# Patient Record
Sex: MALE | Race: BLACK OR AFRICAN AMERICAN | Employment: FULL TIME | URBAN - METROPOLITAN AREA
[De-identification: names, ages, dates, MRNs, and addresses within clinical notes are randomized per-mention and may not be internally consistent; named-entity substitution may affect disease eponyms.]

---
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
## 2020-05-11 ENCOUNTER — APPOINTMENT (OUTPATIENT)
Dept: CT IMAGING | Age: 20
End: 2020-05-11
Attending: EMERGENCY MEDICINE
Payer: OTHER GOVERNMENT

## 2020-05-11 ENCOUNTER — APPOINTMENT (OUTPATIENT)
Dept: GENERAL RADIOLOGY | Age: 20
End: 2020-05-11
Attending: EMERGENCY MEDICINE
Payer: OTHER GOVERNMENT

## 2020-05-11 ENCOUNTER — HOSPITAL ENCOUNTER (INPATIENT)
Age: 20
LOS: 3 days | Discharge: HOME | DRG: 076 | End: 2020-05-14
Attending: INTERNAL MEDICINE | Admitting: FAMILY MEDICINE
Payer: SELF-PAY

## 2020-05-11 ENCOUNTER — HOSPITAL ENCOUNTER (EMERGENCY)
Age: 20
Discharge: ANOTHER HOSPITAL | End: 2020-05-11
Attending: EMERGENCY MEDICINE
Payer: OTHER GOVERNMENT

## 2020-05-11 VITALS
DIASTOLIC BLOOD PRESSURE: 79 MMHG | TEMPERATURE: 101.7 F | OXYGEN SATURATION: 99 % | BODY MASS INDEX: 19.32 KG/M2 | SYSTOLIC BLOOD PRESSURE: 144 MMHG | HEART RATE: 81 BPM | WEIGHT: 138 LBS | RESPIRATION RATE: 15 BRPM | HEIGHT: 71 IN

## 2020-05-11 DIAGNOSIS — R51.9 NONINTRACTABLE HEADACHE, UNSPECIFIED CHRONICITY PATTERN, UNSPECIFIED HEADACHE TYPE: Primary | ICD-10-CM

## 2020-05-11 DIAGNOSIS — G03.9 MENINGITIS: ICD-10-CM

## 2020-05-11 DIAGNOSIS — R11.10 VOMITING, INTRACTABILITY OF VOMITING NOT SPECIFIED, PRESENCE OF NAUSEA NOT SPECIFIED, UNSPECIFIED VOMITING TYPE: ICD-10-CM

## 2020-05-11 LAB
ALBUMIN SERPL-MCNC: 4.3 G/DL (ref 3.5–5)
ALBUMIN/GLOB SERPL: 1.1 {RATIO} (ref 1.2–3.5)
ALP SERPL-CCNC: 74 U/L (ref 50–136)
ALT SERPL-CCNC: 19 U/L (ref 12–65)
ANION GAP SERPL CALC-SCNC: 8 MMOL/L (ref 7–16)
APPEARANCE FLD: COLORLESS
APPEARANCE FLD: COLORLESS
AST SERPL-CCNC: 19 U/L (ref 15–37)
BASOPHILS # BLD: 0.1 K/UL (ref 0–0.2)
BASOPHILS NFR BLD: 0 % (ref 0–2)
BILIRUB SERPL-MCNC: 1 MG/DL (ref 0.2–1.1)
BUN SERPL-MCNC: 5 MG/DL (ref 6–23)
CALCIUM SERPL-MCNC: 9.3 MG/DL (ref 8.3–10.4)
CHLORIDE SERPL-SCNC: 101 MMOL/L (ref 98–107)
CO2 SERPL-SCNC: 25 MMOL/L (ref 21–32)
COLOR FLD: CLEAR
COLOR FLD: CLEAR
CREAT SERPL-MCNC: 1.07 MG/DL (ref 0.8–1.5)
DIFFERENTIAL METHOD BLD: ABNORMAL
EOSINOPHIL # BLD: 0 K/UL (ref 0–0.8)
EOSINOPHIL NFR BLD: 0 % (ref 0.5–7.8)
ERYTHROCYTE [DISTWIDTH] IN BLOOD BY AUTOMATED COUNT: 12.4 % (ref 11.9–14.6)
GLOBULIN SER CALC-MCNC: 3.8 G/DL (ref 2.3–3.5)
GLUCOSE CSF-MCNC: 57 MG/DL (ref 40–70)
GLUCOSE SERPL-MCNC: 111 MG/DL (ref 65–100)
HCT VFR BLD AUTO: 49.7 % (ref 41.1–50.3)
HGB BLD-MCNC: 16.6 G/DL (ref 13.6–17.2)
IMM GRANULOCYTES # BLD AUTO: 0 K/UL (ref 0–0.5)
IMM GRANULOCYTES NFR BLD AUTO: 0 % (ref 0–5)
LYMPHOCYTES # BLD: 1.1 K/UL (ref 0.5–4.6)
LYMPHOCYTES NFR BLD: 9 % (ref 13–44)
LYMPHOCYTES NFR FLD: 89 %
LYMPHOCYTES NFR FLD: 92 %
MCH RBC QN AUTO: 29.1 PG (ref 26.1–32.9)
MCHC RBC AUTO-ENTMCNC: 33.4 G/DL (ref 31.4–35)
MCV RBC AUTO: 87 FL (ref 79.6–97.8)
MENINGITIS PANEL, XMEPT: NORMAL
MONOCYTES # BLD: 0.9 K/UL (ref 0.1–1.3)
MONOCYTES NFR BLD: 7 % (ref 4–12)
MONOS+MACROS NFR FLD: 10 %
MONOS+MACROS NFR FLD: 7 %
NEUTROPHILS NFR FLD: 1 %
NEUTROPHILS NFR FLD: 1 %
NEUTS SEG # BLD: 10.4 K/UL (ref 1.7–8.2)
NEUTS SEG NFR BLD: 83 % (ref 43–78)
NRBC # BLD: 0 K/UL (ref 0–0.2)
NUC CELL # FLD: 74 /CU MM
NUC CELL # FLD: 92 /CU MM
PLATELET # BLD AUTO: 231 K/UL (ref 150–450)
PMV BLD AUTO: 10.5 FL (ref 9.4–12.3)
POTASSIUM SERPL-SCNC: 3.9 MMOL/L (ref 3.5–5.1)
PROT CSF-MCNC: 89 MG/DL (ref 15–45)
PROT SERPL-MCNC: 8.1 G/DL (ref 6.3–8.2)
RBC # BLD AUTO: 5.71 M/UL (ref 4.23–5.6)
RBC # FLD: 11 /CU MM
RBC # FLD: 7 /CU MM
SODIUM SERPL-SCNC: 134 MMOL/L (ref 136–145)
SPECIMEN SOURCE FLD: NORMAL
SPECIMEN SOURCE FLD: NORMAL
TROPONIN I SERPL-MCNC: 0.02 NG/ML (ref 0.02–0.05)
TUBE # CSF: 2
TUBE # CSF: 2
WBC # BLD AUTO: 12.5 K/UL (ref 4.3–11.1)

## 2020-05-11 PROCEDURE — 74011000258 HC RX REV CODE- 258: Performed by: EMERGENCY MEDICINE

## 2020-05-11 PROCEDURE — 81003 URINALYSIS AUTO W/O SCOPE: CPT

## 2020-05-11 PROCEDURE — 74011250636 HC RX REV CODE- 250/636: Performed by: EMERGENCY MEDICINE

## 2020-05-11 PROCEDURE — 87483 CNS DNA AMP PROBE TYPE 12-25: CPT

## 2020-05-11 PROCEDURE — 85025 COMPLETE CBC W/AUTO DIFF WBC: CPT

## 2020-05-11 PROCEDURE — 93005 ELECTROCARDIOGRAM TRACING: CPT | Performed by: EMERGENCY MEDICINE

## 2020-05-11 PROCEDURE — 74011250637 HC RX REV CODE- 250/637: Performed by: FAMILY MEDICINE

## 2020-05-11 PROCEDURE — 96365 THER/PROPH/DIAG IV INF INIT: CPT

## 2020-05-11 PROCEDURE — 74011250636 HC RX REV CODE- 250/636: Performed by: FAMILY MEDICINE

## 2020-05-11 PROCEDURE — 87205 SMEAR GRAM STAIN: CPT

## 2020-05-11 PROCEDURE — 99222 1ST HOSP IP/OBS MODERATE 55: CPT | Performed by: PSYCHIATRY & NEUROLOGY

## 2020-05-11 PROCEDURE — 99285 EMERGENCY DEPT VISIT HI MDM: CPT

## 2020-05-11 PROCEDURE — 71046 X-RAY EXAM CHEST 2 VIEWS: CPT

## 2020-05-11 PROCEDURE — 84157 ASSAY OF PROTEIN OTHER: CPT

## 2020-05-11 PROCEDURE — 87635 SARS-COV-2 COVID-19 AMP PRB: CPT

## 2020-05-11 PROCEDURE — 74011250636 HC RX REV CODE- 250/636: Performed by: INTERNAL MEDICINE

## 2020-05-11 PROCEDURE — 86694 HERPES SIMPLEX NES ANTBDY: CPT

## 2020-05-11 PROCEDURE — 82945 GLUCOSE OTHER FLUID: CPT

## 2020-05-11 PROCEDURE — 74011000258 HC RX REV CODE- 258: Performed by: FAMILY MEDICINE

## 2020-05-11 PROCEDURE — 87040 BLOOD CULTURE FOR BACTERIA: CPT

## 2020-05-11 PROCEDURE — 75810000133 HC LUMBAR PUNCTURE

## 2020-05-11 PROCEDURE — 84484 ASSAY OF TROPONIN QUANT: CPT

## 2020-05-11 PROCEDURE — 89050 BODY FLUID CELL COUNT: CPT

## 2020-05-11 PROCEDURE — 96367 TX/PROPH/DG ADDL SEQ IV INF: CPT

## 2020-05-11 PROCEDURE — 70450 CT HEAD/BRAIN W/O DYE: CPT

## 2020-05-11 PROCEDURE — 96361 HYDRATE IV INFUSION ADD-ON: CPT

## 2020-05-11 PROCEDURE — 65270000029 HC RM PRIVATE

## 2020-05-11 PROCEDURE — 96375 TX/PRO/DX INJ NEW DRUG ADDON: CPT

## 2020-05-11 PROCEDURE — 80053 COMPREHEN METABOLIC PANEL: CPT

## 2020-05-11 RX ORDER — ONDANSETRON 2 MG/ML
4 INJECTION INTRAMUSCULAR; INTRAVENOUS
Status: COMPLETED | OUTPATIENT
Start: 2020-05-11 | End: 2020-05-11

## 2020-05-11 RX ORDER — ACETAMINOPHEN 325 MG/1
650 TABLET ORAL
Status: DISCONTINUED | OUTPATIENT
Start: 2020-05-11 | End: 2020-05-11 | Stop reason: HOSPADM

## 2020-05-11 RX ORDER — HYDROCODONE BITARTRATE AND ACETAMINOPHEN 5; 325 MG/1; MG/1
1 TABLET ORAL
Status: DISCONTINUED | OUTPATIENT
Start: 2020-05-11 | End: 2020-05-11 | Stop reason: HOSPADM

## 2020-05-11 RX ORDER — HYDROMORPHONE HYDROCHLORIDE 1 MG/ML
1 INJECTION, SOLUTION INTRAMUSCULAR; INTRAVENOUS; SUBCUTANEOUS
Status: DISCONTINUED | OUTPATIENT
Start: 2020-05-11 | End: 2020-05-14 | Stop reason: HOSPADM

## 2020-05-11 RX ORDER — DIPHENHYDRAMINE HCL 25 MG
25 CAPSULE ORAL
Status: DISCONTINUED | OUTPATIENT
Start: 2020-05-11 | End: 2020-05-14 | Stop reason: HOSPADM

## 2020-05-11 RX ORDER — ACETAMINOPHEN 325 MG/1
650 TABLET ORAL
Status: CANCELLED | OUTPATIENT
Start: 2020-05-11

## 2020-05-11 RX ORDER — HYDROMORPHONE HYDROCHLORIDE 1 MG/ML
0.5 INJECTION, SOLUTION INTRAMUSCULAR; INTRAVENOUS; SUBCUTANEOUS
Status: COMPLETED | OUTPATIENT
Start: 2020-05-11 | End: 2020-05-11

## 2020-05-11 RX ORDER — ACETAMINOPHEN 325 MG/1
650 TABLET ORAL
Status: DISCONTINUED | OUTPATIENT
Start: 2020-05-11 | End: 2020-05-14 | Stop reason: HOSPADM

## 2020-05-11 RX ORDER — NALOXONE HYDROCHLORIDE 0.4 MG/ML
0.4 INJECTION, SOLUTION INTRAMUSCULAR; INTRAVENOUS; SUBCUTANEOUS AS NEEDED
Status: DISCONTINUED | OUTPATIENT
Start: 2020-05-11 | End: 2020-05-14 | Stop reason: HOSPADM

## 2020-05-11 RX ORDER — HYDROCODONE BITARTRATE AND ACETAMINOPHEN 5; 325 MG/1; MG/1
1 TABLET ORAL
Status: DISCONTINUED | OUTPATIENT
Start: 2020-05-11 | End: 2020-05-14 | Stop reason: HOSPADM

## 2020-05-11 RX ORDER — HYDRALAZINE HYDROCHLORIDE 20 MG/ML
10 INJECTION INTRAMUSCULAR; INTRAVENOUS
Status: DISCONTINUED | OUTPATIENT
Start: 2020-05-11 | End: 2020-05-11

## 2020-05-11 RX ORDER — VANCOMYCIN HYDROCHLORIDE
1250 ONCE
Status: COMPLETED | OUTPATIENT
Start: 2020-05-11 | End: 2020-05-11

## 2020-05-11 RX ORDER — ONDANSETRON 2 MG/ML
4 INJECTION INTRAMUSCULAR; INTRAVENOUS
Status: DISCONTINUED | OUTPATIENT
Start: 2020-05-11 | End: 2020-05-14 | Stop reason: HOSPADM

## 2020-05-11 RX ORDER — HYDROCODONE BITARTRATE AND ACETAMINOPHEN 5; 325 MG/1; MG/1
1 TABLET ORAL
Status: CANCELLED | OUTPATIENT
Start: 2020-05-11

## 2020-05-11 RX ORDER — AMOXICILLIN 250 MG
2 CAPSULE ORAL
Status: DISCONTINUED | OUTPATIENT
Start: 2020-05-11 | End: 2020-05-14 | Stop reason: HOSPADM

## 2020-05-11 RX ADMIN — ONDANSETRON 4 MG: 2 INJECTION INTRAMUSCULAR; INTRAVENOUS at 07:50

## 2020-05-11 RX ADMIN — AMPICILLIN SODIUM 2 G: 2 INJECTION, POWDER, FOR SOLUTION INTRAMUSCULAR; INTRAVENOUS at 19:51

## 2020-05-11 RX ADMIN — HYDROMORPHONE HYDROCHLORIDE 0.5 MG: 1 INJECTION, SOLUTION INTRAMUSCULAR; INTRAVENOUS; SUBCUTANEOUS at 12:05

## 2020-05-11 RX ADMIN — AMPICILLIN SODIUM 2 G: 2 INJECTION, POWDER, FOR SOLUTION INTRAMUSCULAR; INTRAVENOUS at 16:40

## 2020-05-11 RX ADMIN — HYDROCODONE BITARTRATE AND ACETAMINOPHEN 1 TABLET: 5; 325 TABLET ORAL at 13:21

## 2020-05-11 RX ADMIN — SODIUM CHLORIDE 1000 ML: 900 INJECTION, SOLUTION INTRAVENOUS at 07:48

## 2020-05-11 RX ADMIN — HYDROMORPHONE HYDROCHLORIDE 0.5 MG: 1 INJECTION, SOLUTION INTRAMUSCULAR; INTRAVENOUS; SUBCUTANEOUS at 07:51

## 2020-05-11 RX ADMIN — ACYCLOVIR SODIUM 650 MG: 50 INJECTION, SOLUTION INTRAVENOUS at 22:29

## 2020-05-11 RX ADMIN — ACYCLOVIR SODIUM 650 MG: 50 INJECTION, SOLUTION INTRAVENOUS at 15:23

## 2020-05-11 RX ADMIN — CEFTRIAXONE SODIUM 2 G: 2 INJECTION, POWDER, FOR SOLUTION INTRAMUSCULAR; INTRAVENOUS at 13:22

## 2020-05-11 RX ADMIN — ACETAMINOPHEN 650 MG: 325 TABLET, FILM COATED ORAL at 19:51

## 2020-05-11 RX ADMIN — AMPICILLIN SODIUM 2 G: 2 INJECTION, POWDER, FOR SOLUTION INTRAMUSCULAR; INTRAVENOUS at 23:26

## 2020-05-11 RX ADMIN — VANCOMYCIN HYDROCHLORIDE 1000 MG: 1 INJECTION, POWDER, LYOPHILIZED, FOR SOLUTION INTRAVENOUS at 21:07

## 2020-05-11 RX ADMIN — VANCOMYCIN HYDROCHLORIDE 1250 MG: 10 INJECTION, POWDER, LYOPHILIZED, FOR SOLUTION INTRAVENOUS at 11:54

## 2020-05-11 RX ADMIN — ONDANSETRON 4 MG: 2 INJECTION INTRAMUSCULAR; INTRAVENOUS at 21:02

## 2020-05-11 NOTE — ED PROVIDER NOTES
Patient is a 23 yo male with headache, nausea and vomiting and chest pain. States symptoms since yesterday, states gradual onset of headache and persistent nausea and vomiting. No fevers however has had chills. States chest pain since he started vomiting and states \"I think its just from all the vomiting\". NO abdominal pain, no further complaints. Past Medical History:   Diagnosis Date    Other ill-defined conditions(629.89)     eczema       History reviewed. No pertinent surgical history.       Family History:   Problem Relation Age of Onset    No Known Problems Mother     No Known Problems Father        Social History     Socioeconomic History    Marital status: SINGLE     Spouse name: Not on file    Number of children: Not on file    Years of education: Not on file    Highest education level: Not on file   Occupational History    Not on file   Social Needs    Financial resource strain: Not on file    Food insecurity     Worry: Not on file     Inability: Not on file    Transportation needs     Medical: Not on file     Non-medical: Not on file   Tobacco Use    Smoking status: Never Smoker    Smokeless tobacco: Never Used   Substance and Sexual Activity    Alcohol use: Yes     Comment: OCC    Drug use: No     Comment: History of marijuana use    Sexual activity: Not Currently   Lifestyle    Physical activity     Days per week: Not on file     Minutes per session: Not on file    Stress: Not on file   Relationships    Social connections     Talks on phone: Not on file     Gets together: Not on file     Attends Rastafarian service: Not on file     Active member of club or organization: Not on file     Attends meetings of clubs or organizations: Not on file     Relationship status: Not on file    Intimate partner violence     Fear of current or ex partner: Not on file     Emotionally abused: Not on file     Physically abused: Not on file     Forced sexual activity: Not on file   Other Topics Concern    Not on file   Social History Narrative    Not on file         ALLERGIES: Patient has no known allergies. Review of Systems   Constitutional: Positive for chills and fatigue. Negative for fever. HENT: Negative for rhinorrhea and sore throat. Eyes: Negative for visual disturbance. Respiratory: Negative for cough and shortness of breath. Cardiovascular: Positive for chest pain. Negative for leg swelling. Gastrointestinal: Positive for nausea and vomiting. Negative for abdominal pain and diarrhea. Genitourinary: Negative for dysuria. Musculoskeletal: Negative for back pain and neck pain. Skin: Negative for rash. Neurological: Positive for headaches. Negative for weakness. Psychiatric/Behavioral: The patient is not nervous/anxious. Vitals:    05/11/20 0721   BP: 118/66   Pulse: 85   Resp: 20   Temp: 98.5 °F (36.9 °C)   SpO2: 98%   Weight: 62.6 kg (138 lb)   Height: 5' 11\" (1.803 m)            Physical Exam  Vitals signs and nursing note reviewed. Constitutional:       Appearance: He is well-developed. HENT:      Head: Normocephalic. Right Ear: External ear normal.      Left Ear: External ear normal.   Eyes:      Conjunctiva/sclera: Conjunctivae normal.      Pupils: Pupils are equal, round, and reactive to light. Neck:      Musculoskeletal: Normal range of motion and neck supple. Trachea: No tracheal deviation. Cardiovascular:      Rate and Rhythm: Normal rate and regular rhythm. Heart sounds: Normal heart sounds. No murmur. Pulmonary:      Effort: Pulmonary effort is normal. No respiratory distress. Breath sounds: Normal breath sounds. Abdominal:      Palpations: Abdomen is soft. Tenderness: There is no abdominal tenderness. Musculoskeletal: Normal range of motion. Skin:     Findings: No rash. Neurological:      Mental Status: He is alert and oriented to person, place, and time. Cranial Nerves: No cranial nerve deficit. Comments: Cn 2-12 fully intact, strength and sensation 5/5 in all extremities, negative pronator drift, ambulates without difficulty, no focal deficits appreciated. MDM  Number of Diagnoses or Management Options  Nonintractable headache, unspecified chronicity pattern, unspecified headache type: new and requires workup  Vomiting, intractability of vomiting not specified, presence of nausea not specified, unspecified vomiting type: new and requires workup     Amount and/or Complexity of Data Reviewed  Clinical lab tests: ordered and reviewed  Tests in the radiology section of CPT®: ordered and reviewed  Tests in the medicine section of CPT®: reviewed and ordered  Review and summarize past medical records: yes    Risk of Complications, Morbidity, and/or Mortality  Presenting problems: high  Diagnostic procedures: high  Management options: high    Patient Progress  Patient progress: stable         Lumbar Puncture  Date/Time: 5/11/2020 11:09 AM  Performed by: Christel Bautista MD  Authorized by: Christel Bautista MD     Consent:     Consent obtained:  Written    Consent given by:  Patient    Risks discussed:  Bleeding, pain, infection, headache, nerve damage and repeat procedure    Alternatives discussed:  No treatment, delayed treatment and alternative treatment  Procedure details:     Lumbar space:  L4-L5 interspace    Patient position:  Sitting    Needle gauge:  20    Needle type:  Spinal needle - Quincke tip    Needle length (in):  2.5    Ultrasound guidance: no      Number of attempts:  1    Fluid appearance:  Clear    Tubes of fluid:  4    Total volume (ml):  5  Post-procedure:     Puncture site:  Adhesive bandage applied and direct pressure applied    Patient tolerance of procedure:   Tolerated well, no immediate complications        Recent Results (from the past 12 hour(s))   CBC WITH AUTOMATED DIFF    Collection Time: 05/11/20  7:33 AM   Result Value Ref Range    WBC 12.5 (H) 4.3 - 11.1 K/uL RBC 5.71 (H) 4.23 - 5.6 M/uL    HGB 16.6 13.6 - 17.2 g/dL    HCT 49.7 41.1 - 50.3 %    MCV 87.0 79.6 - 97.8 FL    MCH 29.1 26.1 - 32.9 PG    MCHC 33.4 31.4 - 35.0 g/dL    RDW 12.4 11.9 - 14.6 %    PLATELET 508 693 - 158 K/uL    MPV 10.5 9.4 - 12.3 FL    ABSOLUTE NRBC 0.00 0.0 - 0.2 K/uL    DF AUTOMATED      NEUTROPHILS 83 (H) 43 - 78 %    LYMPHOCYTES 9 (L) 13 - 44 %    MONOCYTES 7 4.0 - 12.0 %    EOSINOPHILS 0 (L) 0.5 - 7.8 %    BASOPHILS 0 0.0 - 2.0 %    IMMATURE GRANULOCYTES 0 0.0 - 5.0 %    ABS. NEUTROPHILS 10.4 (H) 1.7 - 8.2 K/UL    ABS. LYMPHOCYTES 1.1 0.5 - 4.6 K/UL    ABS. MONOCYTES 0.9 0.1 - 1.3 K/UL    ABS. EOSINOPHILS 0.0 0.0 - 0.8 K/UL    ABS. BASOPHILS 0.1 0.0 - 0.2 K/UL    ABS. IMM. GRANS. 0.0 0.0 - 0.5 K/UL   METABOLIC PANEL, COMPREHENSIVE    Collection Time: 05/11/20  7:33 AM   Result Value Ref Range    Sodium 134 (L) 136 - 145 mmol/L    Potassium 3.9 3.5 - 5.1 mmol/L    Chloride 101 98 - 107 mmol/L    CO2 25 21 - 32 mmol/L    Anion gap 8 7 - 16 mmol/L    Glucose 111 (H) 65 - 100 mg/dL    BUN 5 (L) 6 - 23 MG/DL    Creatinine 1.07 0.8 - 1.5 MG/DL    GFR est AA >60 >60 ml/min/1.73m2    GFR est non-AA >60 >60 ml/min/1.73m2    Calcium 9.3 8.3 - 10.4 MG/DL    Bilirubin, total 1.0 0.2 - 1.1 MG/DL    ALT (SGPT) 19 12 - 65 U/L    AST (SGOT) 19 15 - 37 U/L    Alk.  phosphatase 74 50 - 136 U/L    Protein, total 8.1 6.3 - 8.2 g/dL    Albumin 4.3 3.5 - 5.0 g/dL    Globulin 3.8 (H) 2.3 - 3.5 g/dL    A-G Ratio 1.1 (L) 1.2 - 3.5     TROPONIN I    Collection Time: 05/11/20  7:33 AM   Result Value Ref Range    Troponin-I, Qt. 0.02 0.02 - 0.05 NG/ML   CELL COUNT, BODY FLUID    Collection Time: 05/11/20  9:05 AM   Result Value Ref Range    BODY FLUID TYPE CEREBROSPINAL FLUID      FLUID COLOR CLEAR      FLUID APPEARANCE COLORLESS      FLUID RBC CT. 7 /cu mm    FLUID WBC COUNT 92 /cu mm   PROTEIN, CSF    Collection Time: 05/11/20  9:05 AM   Result Value Ref Range    Tube No. 2      Protein,CSF 89 (H) 15 - 45 MG/DL GLUCOSE, CSF    Collection Time: 05/11/20  9:05 AM   Result Value Ref Range    Tube No. 2      Glucose,CSF 57 40 - 70 MG/DL   CELL COUNT, BODY FLUID    Collection Time: 05/11/20  9:05 AM   Result Value Ref Range    BODY FLUID TYPE CEREBROSPINAL FLUID      FLUID COLOR CLEAR      FLUID APPEARANCE COLORLESS      FLUID RBC CT. 11 /cu mm    FLUID WBC COUNT 74 /cu mm     Xr Chest Pa Lat    Result Date: 5/11/2020  TWO-VIEW CHEST: CLINICAL HISTORY: Headache, fever, cough, nausea, and vomiting congestion morning. Now with leukocytosis and clinical concern for Covid19. COMPARISON:  December 23, 2019. FINDINGS: PA and lateral chest images demonstrate no acute pneumonic infiltrate or significant pleural fluid collection. The heart size is within normal limits without evidence of congestive heart failure or pneumothorax. The bony thorax appears intact on these views. There are overlying radiopaque support devices. IMPRESSION:  NO ACUTE CARDIOPULMONARY DISEASE IDENTIFIED. Ct Head Wo Cont    Result Date: 5/11/2020  NONCONTRAST HEAD CT CLINICAL HISTORY:  Frontal headache for 24 hours with no known injury. TECHNIQUE:  Axial images were obtained with spiral technique. Radiation dose reduction was achieved using one or all of the following techniques: automated exposure control, weight-based dosing, iterative reconstruction. COMPARISON:  None. REPORT:   Standard noncontrast head CT demonstrates no definite intracranial mass effect, hemorrhage, or evidence of acute geographic infarction. The ventricles are normal in size and configuration, accounting for the patient's age. Orbits  and paranasal sinuses are clear where imaged. Bone windows demonstrate no definite fracture or destruction.      IMPRESSION:     NO ACUTE INTRACRANIAL ABNORMALITY IDENTIFIED AT NONCONTRAST CT.     23 yo male with headache:       Concern for possible meningitis based on CSF findings so will place on BSA abx, cultures pending, will admit for further workup and management. 2:47 PM I just spoke with  Dr. 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 I spoke immediately with Dr. Karli Arcos the admitting hospitalist and patient will be started on acyclovir immediately.

## 2020-05-11 NOTE — ED NOTES
TRANSFER - OUT REPORT:    Verbal report given to Johanna Harris RN(name) on Mia Singer  being transferred to [facility](unit) for routine progression of care       Report consisted of patients Situation, Background, Assessment and   Recommendations(SBAR). Information from the following report(s) ED Summary was reviewed with the receiving nurse. Lines:   Peripheral IV 05/11/20 Right Antecubital (Active)       Peripheral IV 05/11/20 Left Antecubital (Active)   Site Assessment Clean, dry, & intact 5/11/2020  1:05 PM   Phlebitis Assessment 0 5/11/2020  1:05 PM   Infiltration Assessment 0 5/11/2020  1:05 PM   Dressing Status Clean, dry, & intact 5/11/2020  1:05 PM   Dressing Type Transparent 5/11/2020  1:05 PM   Hub Color/Line Status Pink 5/11/2020  1:05 PM   Action Taken Blood drawn 5/11/2020  1:05 PM        Opportunity for questions and clarification was provided.       Patient transported with:   Rosina Holly

## 2020-05-11 NOTE — PROGRESS NOTES
TRANSFER - IN REPORT:    Verbal report received from Er on [address]  being received from [facility] for routine progression of care      Report consisted of patients Situation, Background, Assessment and   Recommendations(SBAR). Information from the following report(s) SBAR was reviewed with the receiving nurse. Opportunity for questions and clarification was provided. Assessment completed upon patients arrival to unit and care assumed.

## 2020-05-11 NOTE — CONSULTS
Consult    Patient: Christopher Greenberg MRN: [id number]  SSN: [id number]    YOB: 2000  Age: 22 y.o. Sex: male      Subjective:      Christopher Greenberg is a 22 y.o. male who is being seen for evaluation of acute fever headache and finding of spinal fluid pleocytosis and PCR testing suggestive of HSV-2 meningitis    No prior history. Past Medical History:   Diagnosis Date    Meningitis 5/11/2020    Other ill-defined conditions(799.89)     eczema     No past surgical history on file.    Family History   Problem Relation Age of Onset    No Known Problems Mother     No Known Problems Father      Social History     Tobacco Use    Smoking status: Never Smoker    Smokeless tobacco: Never Used   Substance Use Topics    Alcohol use: Yes     Comment: OCC      Current Facility-Administered Medications   Medication Dose Route Frequency Provider Last Rate Last Dose    naloxone (NARCAN) injection 0.4 mg  0.4 mg IntraVENous PRN Daija Pilon, DO        diphenhydrAMINE (BENADRYL) capsule 25 mg  25 mg Oral Q4H PRN Daija Pilon, DO        ondansetron [facility]) injection 4 mg  4 mg IntraVENous Q4H PRN Daija Pilon, DO        senna-docusate (PERICOLACE) 8.6-50 mg per tablet 2 Tab  2 Tab Oral DAILY PRN Daija Pilon, DO        HYDROmorphone (PF) (DILAUDID) injection 1 mg  1 mg IntraVENous Q4H PRN Daija Pilon, DO        acyclovir (ZOVIRAX) 650 mg in 0.9% sodium chloride 100 mL IVPB  10 mg/kg IntraVENous Q8H Baron Franck Alston, DO   650 mg at 05/11/20 1523    [START ON 5/12/2020] cefTRIAXone (ROCEPHIN) 2 g in 0.9% sodium chloride (MBP/ADV) 50 mL  2 g IntraVENous Q12H Daija Pilon, DO        ampicillin (OMNIPEN) 2 g in 0.9% sodium chloride (MBP/ADV) 100 mL  2 g IntraVENous Q4H Baron Franck Alston,  mL/hr at 05/11/20 1640 2 g at 05/11/20 1640    vancomycin (VANCOCIN) 1,000 mg in 0.9% sodium chloride (MBP/ADV) 250 mL  1,000 mg IntraVENous Q8H Karey Armstrong MD            No Known Allergies    Review of Systems:  Denies prior systemic symptoms. Denies prior history of headache denies any type of CSF rhinorrhea denies sore throat denies any bleeding tendency    Objective:     Vitals:    05/11/20 1438   BP: 137/74   Pulse: 78   Resp: 16   Temp: (!) 101.6 °F (38.7 °C)   SpO2: 99%        Physical Exam:  The patient is alert cooperative and oriented  Patient looks well-hydrated. He appears acutely ill. Examination of the head and neck reveals a positive Brudzinski and positive Kernig's sign  Cranial nerve examination normal visual fields. Normal random eye movements. No ptosis. No lid lag  Face moves strongly symmetrically and equally  Speech is normal  Motor control of the upper extremities is normal.  Casual gait is normal with no evidence of ataxia  Fine motor coordination is normal    Peripheral sensation light touch normal  Deep tendon reflexes are symmetric  He performs heel-knee-to-shin and finger-to-nose testing well    Assessment:   HSV meningitis no evidence clinically to suggest encephalitis at this point in time but MRI brain is appropriate. Hospital Problems  Date Reviewed: 6/10/2017          Codes Class Noted POA    Meningitis ICD-10-CM: G03.9  ICD-9-CM: 322.9  5/11/2020 Unknown              Plan:     Agree with coverage with acyclovir 10 mg/kg every 8 hourly. Close clinical observation    Would not utilize corticosteroids at this point.     COVID testing is appropriate in terms of comorbidities    Signed By: Janene Ortiz MD     May 11, 2020

## 2020-05-11 NOTE — ACP (ADVANCE CARE PLANNING)
Advance Care Planning Clinical Specialist  Conversation Note      Date of ACP Conversation: 5/11/2020    Conversation Conducted with:   Patient with [address] [employer] (mother) #[phone], as per verbal agreement. Pt was not interested in filling out HCPOA paperwork at this time.      ACP Clinical Specialist: Balaji Galloway RN

## 2020-05-11 NOTE — H&P
Hospitalist Admission History and Physical     NAME:  Marino Sanchez   Age:  22 y.o.  :   2000   MRN:   [id number]  PCP: Mateo Stockton MD  Consulting MD:  Treatment Team: Attending Provider: Jackie Horner MD    No chief complaint on file. HPI:   Patient is a 22 y.o. male who presented to the ED for cc headache, nausea, and emesis since last night. Also with nuchal rigidity. No significant past medical history. No significant surgical or family hx. LP fluid showed WBC 74, glucose 57, and protein 89. Vitals - stable    Labs- WBC 12.5. CT head showed no acute findings    Past Medical History:   Diagnosis Date    Meningitis 2020    Other ill-defined conditions(799.89)     eczema        No past surgical history on file.      Family History   Problem Relation Age of Onset    No Known Problems Mother     No Known Problems Father        Social History     Social History Narrative    Not on file        Social History     Tobacco Use    Smoking status: Never Smoker    Smokeless tobacco: Never Used   Substance Use Topics    Alcohol use: Yes     Comment: [id number]        Social History     Substance and Sexual Activity   Drug Use No    Comment: History of marijuana use         No Known Allergies    Prior to Admission medications    Not on File           Review of Systems      Constitutional: headache without visual disturbance but is sensitive to light  Eyes:  no change in visual acuity, no photophobia  Ears, nose, mouth, throat, and face: no  Odynphagia, dysphagia, no thrush or exudate, negative for chronic sinus congestion, recurrent headaches  Respiratory: negative for SOB, hemoptysis or cough  Cardiovascular: negative for CP, palpitations, or PND  Gastrointestinal: negative for abdominal pain, no hematemesis, hematochezia or BRBPR  Genitourinary: no urgency, frequency, or dysuria, no nocturia  Integument/breast: negative for skin rash or skin lesions  Hematologic/lymphatic: negative for known bleeding disorder  Musculoskeletal:nuchal rigidity   Neurological: headache  Behavioral/Psych: negative for depression or chronic anxiety,   Endocrine: negative for polydyspia, polyuria or intolerance to heat or cold  Allergic/Immunologic: negative for chronic allergic rhinitis, or known connective tissue disorder      Objective: There were no vitals taken for this visit. No intake/output data recorded. No intake/output data recorded. Data Review:   Recent Results (from the past 24 hour(s))   CBC WITH AUTOMATED DIFF    Collection Time: 05/11/20  7:33 AM   Result Value Ref Range    WBC 12.5 (H) 4.3 - 11.1 K/uL    RBC 5.71 (H) 4.23 - 5.6 M/uL    HGB 16.6 13.6 - 17.2 g/dL    HCT 49.7 41.1 - 50.3 %    MCV 87.0 79.6 - 97.8 FL    MCH 29.1 26.1 - 32.9 PG    MCHC 33.4 31.4 - 35.0 g/dL    RDW 12.4 11.9 - 14.6 %    PLATELET 570 051 - 648 K/uL    MPV 10.5 9.4 - 12.3 FL    ABSOLUTE NRBC 0.00 0.0 - 0.2 K/uL    DF AUTOMATED      NEUTROPHILS 83 (H) 43 - 78 %    LYMPHOCYTES 9 (L) 13 - 44 %    MONOCYTES 7 4.0 - 12.0 %    EOSINOPHILS 0 (L) 0.5 - 7.8 %    BASOPHILS 0 0.0 - 2.0 %    IMMATURE GRANULOCYTES 0 0.0 - 5.0 %    ABS. NEUTROPHILS 10.4 (H) 1.7 - 8.2 K/UL    ABS. LYMPHOCYTES 1.1 0.5 - 4.6 K/UL    ABS. MONOCYTES 0.9 0.1 - 1.3 K/UL    ABS. EOSINOPHILS 0.0 0.0 - 0.8 K/UL    ABS. BASOPHILS 0.1 0.0 - 0.2 K/UL    ABS. IMM. GRANS. 0.0 0.0 - 0.5 K/UL   METABOLIC PANEL, COMPREHENSIVE    Collection Time: 05/11/20  7:33 AM   Result Value Ref Range    Sodium 134 (L) 136 - 145 mmol/L    Potassium 3.9 3.5 - 5.1 mmol/L    Chloride 101 98 - 107 mmol/L    CO2 25 21 - 32 mmol/L    Anion gap 8 7 - 16 mmol/L    Glucose 111 (H) 65 - 100 mg/dL    BUN 5 (L) 6 - 23 MG/DL    Creatinine 1.07 0.8 - 1.5 MG/DL    GFR est AA >60 >60 ml/min/1.73m2    GFR est non-AA >60 >60 ml/min/1.73m2    Calcium 9.3 8.3 - 10.4 MG/DL    Bilirubin, total 1.0 0.2 - 1.1 MG/DL    ALT (SGPT) 19 12 - 65 U/L    AST (SGOT) 19 15 - 37 U/L    Alk. phosphatase 74 50 - 136 U/L    Protein, total 8.1 6.3 - 8.2 g/dL    Albumin 4.3 3.5 - 5.0 g/dL    Globulin 3.8 (H) 2.3 - 3.5 g/dL    A-G Ratio 1.1 (L) 1.2 - 3.5     TROPONIN I    Collection Time: 05/11/20  7:33 AM   Result Value Ref Range    Troponin-I, Qt. 0.02 0.02 - 0.05 NG/ML   CELL COUNT, BODY FLUID    Collection Time: 05/11/20  9:05 AM   Result Value Ref Range    BODY FLUID TYPE CEREBROSPINAL FLUID      FLUID COLOR CLEAR      FLUID APPEARANCE COLORLESS      FLUID RBC CT. 7 /cu mm    FLUID WBC COUNT 92 /cu mm    FLD NEUTROPHILS 1 %    FLD LYMPHS 92 %    FLD MONO/MACROPHAGES 7 %   CULTURE, CSF W GRAM STAIN    Collection Time: 05/11/20  9:05 AM   Result Value Ref Range    Special Requests: NO SPECIAL REQUESTS      GRAM STAIN 2 TO 5 WBC'S/OIF     GRAM STAIN NO DEFINITE ORGANISM SEEN      Culture result: PENDING    PROTEIN, CSF    Collection Time: 05/11/20  9:05 AM   Result Value Ref Range    Tube No. 2      Protein,CSF 89 (H) 15 - 45 MG/DL   GLUCOSE, CSF    Collection Time: 05/11/20  9:05 AM   Result Value Ref Range    Tube No. 2      Glucose,CSF 57 40 - 70 MG/DL   CELL COUNT, BODY FLUID    Collection Time: 05/11/20  9:05 AM   Result Value Ref Range    BODY FLUID TYPE CEREBROSPINAL FLUID      FLUID COLOR CLEAR      FLUID APPEARANCE COLORLESS      FLUID RBC CT. 11 /cu mm    FLUID WBC COUNT 74 /cu mm    FLD NEUTROPHILS 1 %    FLD LYMPHS 89 %    FLD MONO/MACROPHAGES 10 %       Physical Exam:     General:  Alert, cooperative, obvious discomfort . Eyes:  Slow reactive to light    Ears:  Normal TMs and external ear canals both ears. Nose: Nares normal. Septum midline. Mouth/Throat: Slight dry oral mucosa   Neck:  no JVD. Unable to flex neck without significant pain   Back:   deferred   Lungs:   Clear to auscultation bilaterally. Heart:  Regular rate and rhythm, S1, S2 normal, no murmur, click, rub or gallop. Abdomen:   Soft, non-tender. Bowel sounds normal. No masses,  No organomegaly.    Extremities: Extremities normal, atraumatic, no cyanosis or edema. No petechiae seen    Pulses: 2+ and symmetric all extremities. Skin: Skin color, texture, turgor normal. No rashes or lesions   Lymph nodes: Cervical, supraclavicular, and axillary nodes normal.   Neurologic: As above      Assessment and Plan     Active Problems:    Meningitis (5/11/2020)      Blood and CSF cultures have been ordered. Check HSV. Start Acyclovir 10mg/kg q 8hr, Ceftiaxone 2g BID, Vancomycin pharmacy to dose and ampicillin 2g q 4 hr. Consult neurology to see in AM. Suspecting viral meningitis. Do not think has COVID at all since we have a known source for the infection along with no respiratory or GI concerns. Cancel COVID orders.      DVT prophylaxis - SCDs  Signed By: Yusuf Monterroso DO   May 11, 2020

## 2020-05-11 NOTE — PROGRESS NOTES
Pharmacokinetic Consult to Pharmacist    Rossana Carmencita is a 22 y.o. male being treated for possible meningitis with Vancomycin, Ceftriaxone, Ampicillin, and Acyclovir. Lab Results   Component Value Date/Time    BUN 5 (L) 05/11/2020 07:33 AM    Creatinine 1.07 05/11/2020 07:33 AM    WBC 12.5 (H) 05/11/2020 07:33 AM      Estimated Creatinine Clearance: 96.7 mL/min (based on SCr of 1.07 mg/dL). CULTURES:  pending      Day 1 of vancomycin. Goal trough is 15-20. Vancomycin dose initiated at 1250 mg X 1, then 1000 mg Q8H. Plan trough prior to the 5th dose. Will continue to follow patient and order levels when clinically indicated.     Thank you,  Susan Lo, PharmD, [id number]  Clinical Pharmacist  [phone]

## 2020-05-11 NOTE — ED TRIAGE NOTES
Pt c\o headache, n/v, fever, cough, SOB since yesterday morning. Has been taking Advil at home, last dose around [id number] this AM.

## 2020-05-11 NOTE — PROGRESS NOTES
Neurology just in to see pt. Pt instructed to not get up without assist.  Pt drowsy but will arouse and talk.

## 2020-05-12 ENCOUNTER — APPOINTMENT (OUTPATIENT)
Dept: MRI IMAGING | Age: 20
DRG: 076 | End: 2020-05-12
Attending: PSYCHIATRY & NEUROLOGY
Payer: SELF-PAY

## 2020-05-12 ENCOUNTER — PATIENT OUTREACH (OUTPATIENT)
Dept: CASE MANAGEMENT | Age: 20
End: 2020-05-12

## 2020-05-12 LAB
ANION GAP SERPL CALC-SCNC: 6 MMOL/L (ref 7–16)
BASOPHILS # BLD: 0.1 K/UL (ref 0–0.2)
BASOPHILS NFR BLD: 1 % (ref 0–2)
BUN SERPL-MCNC: 6 MG/DL (ref 6–23)
CALCIUM SERPL-MCNC: 8.9 MG/DL (ref 8.3–10.4)
CHLORIDE SERPL-SCNC: 105 MMOL/L (ref 98–107)
CO2 SERPL-SCNC: 27 MMOL/L (ref 21–32)
CREAT SERPL-MCNC: 0.98 MG/DL (ref 0.8–1.5)
DIFFERENTIAL METHOD BLD: NORMAL
EOSINOPHIL # BLD: 0.1 K/UL (ref 0–0.8)
EOSINOPHIL NFR BLD: 1 % (ref 0.5–7.8)
ERYTHROCYTE [DISTWIDTH] IN BLOOD BY AUTOMATED COUNT: 12.4 % (ref 11.9–14.6)
GLUCOSE SERPL-MCNC: 98 MG/DL (ref 65–100)
HCT VFR BLD AUTO: 46.4 % (ref 41.1–50.3)
HGB BLD-MCNC: 15.3 G/DL (ref 13.6–17.2)
HIV 1+2 AB+HIV1 P24 AG SERPL QL IA: NONREACTIVE
HIV12 RESULT COMMENT, HHIVC: ABNORMAL
IMM GRANULOCYTES # BLD AUTO: 0 K/UL (ref 0–0.5)
IMM GRANULOCYTES NFR BLD AUTO: 0 % (ref 0–5)
LYMPHOCYTES # BLD: 1.7 K/UL (ref 0.5–4.6)
LYMPHOCYTES NFR BLD: 20 % (ref 13–44)
MCH RBC QN AUTO: 29 PG (ref 26.1–32.9)
MCHC RBC AUTO-ENTMCNC: 33 G/DL (ref 31.4–35)
MCV RBC AUTO: 88 FL (ref 79.6–97.8)
MENINGITIS PANEL, XMEPT: NORMAL
MONOCYTES # BLD: 1 K/UL (ref 0.1–1.3)
MONOCYTES NFR BLD: 11 % (ref 4–12)
NEUTS SEG # BLD: 5.8 K/UL (ref 1.7–8.2)
NEUTS SEG NFR BLD: 67 % (ref 43–78)
NRBC # BLD: 0 K/UL (ref 0–0.2)
PLATELET # BLD AUTO: 201 K/UL (ref 150–450)
PMV BLD AUTO: 10.7 FL (ref 9.4–12.3)
POTASSIUM SERPL-SCNC: 3.9 MMOL/L (ref 3.5–5.1)
RBC # BLD AUTO: 5.27 M/UL (ref 4.23–5.6)
SODIUM SERPL-SCNC: 138 MMOL/L (ref 136–145)
WBC # BLD AUTO: 8.6 K/UL (ref 4.3–11.1)

## 2020-05-12 PROCEDURE — 74011000258 HC RX REV CODE- 258: Performed by: FAMILY MEDICINE

## 2020-05-12 PROCEDURE — 65270000029 HC RM PRIVATE

## 2020-05-12 PROCEDURE — 74011250637 HC RX REV CODE- 250/637: Performed by: FAMILY MEDICINE

## 2020-05-12 PROCEDURE — A9575 INJ GADOTERATE MEGLUMI 0.1ML: HCPCS | Performed by: INTERNAL MEDICINE

## 2020-05-12 PROCEDURE — 36415 COLL VENOUS BLD VENIPUNCTURE: CPT

## 2020-05-12 PROCEDURE — 80048 BASIC METABOLIC PNL TOTAL CA: CPT

## 2020-05-12 PROCEDURE — 87389 HIV-1 AG W/HIV-1&-2 AB AG IA: CPT

## 2020-05-12 PROCEDURE — 65270000015 HC RM PRIVATE ONCOLOGY

## 2020-05-12 PROCEDURE — 74011250636 HC RX REV CODE- 250/636: Performed by: INTERNAL MEDICINE

## 2020-05-12 PROCEDURE — 87040 BLOOD CULTURE FOR BACTERIA: CPT

## 2020-05-12 PROCEDURE — 70553 MRI BRAIN STEM W/O & W/DYE: CPT

## 2020-05-12 PROCEDURE — 77030040361 HC SLV COMPR DVT MDII -B

## 2020-05-12 PROCEDURE — 74011250636 HC RX REV CODE- 250/636: Performed by: FAMILY MEDICINE

## 2020-05-12 PROCEDURE — 85025 COMPLETE CBC W/AUTO DIFF WBC: CPT

## 2020-05-12 RX ORDER — GADOTERATE MEGLUMINE 376.9 MG/ML
12 INJECTION INTRAVENOUS
Status: COMPLETED | OUTPATIENT
Start: 2020-05-12 | End: 2020-05-12

## 2020-05-12 RX ORDER — SODIUM CHLORIDE 9 MG/ML
100 INJECTION, SOLUTION INTRAVENOUS CONTINUOUS
Status: DISCONTINUED | OUTPATIENT
Start: 2020-05-12 | End: 2020-05-14 | Stop reason: HOSPADM

## 2020-05-12 RX ORDER — SODIUM CHLORIDE 0.9 % (FLUSH) 0.9 %
10 SYRINGE (ML) INJECTION
Status: COMPLETED | OUTPATIENT
Start: 2020-05-12 | End: 2020-05-12

## 2020-05-12 RX ADMIN — Medication 10 ML: at 17:27

## 2020-05-12 RX ADMIN — AMPICILLIN SODIUM 2 G: 2 INJECTION, POWDER, FOR SOLUTION INTRAMUSCULAR; INTRAVENOUS at 03:10

## 2020-05-12 RX ADMIN — HYDROCODONE BITARTRATE AND ACETAMINOPHEN 1 TABLET: 5; 325 TABLET ORAL at 13:58

## 2020-05-12 RX ADMIN — AMPICILLIN SODIUM 2 G: 2 INJECTION, POWDER, FOR SOLUTION INTRAMUSCULAR; INTRAVENOUS at 07:21

## 2020-05-12 RX ADMIN — ACYCLOVIR SODIUM 650 MG: 50 INJECTION, SOLUTION INTRAVENOUS at 21:22

## 2020-05-12 RX ADMIN — SODIUM CHLORIDE 100 ML/HR: 900 INJECTION, SOLUTION INTRAVENOUS at 15:49

## 2020-05-12 RX ADMIN — CEFTRIAXONE SODIUM 2 G: 2 INJECTION, POWDER, FOR SOLUTION INTRAMUSCULAR; INTRAVENOUS at 00:37

## 2020-05-12 RX ADMIN — ACETAMINOPHEN 650 MG: 325 TABLET, FILM COATED ORAL at 00:44

## 2020-05-12 RX ADMIN — ACYCLOVIR SODIUM 650 MG: 50 INJECTION, SOLUTION INTRAVENOUS at 05:53

## 2020-05-12 RX ADMIN — VANCOMYCIN HYDROCHLORIDE 1000 MG: 1 INJECTION, POWDER, LYOPHILIZED, FOR SOLUTION INTRAVENOUS at 03:46

## 2020-05-12 RX ADMIN — ACYCLOVIR SODIUM 650 MG: 50 INJECTION, SOLUTION INTRAVENOUS at 13:58

## 2020-05-12 RX ADMIN — GADOTERATE MEGLUMINE 12 ML: 376.9 INJECTION INTRAVENOUS at 17:26

## 2020-05-12 NOTE — PROGRESS NOTES
Progress Note    Patient: Any Martin MRN: [id number]  SSN: [id number]    YOB: 2000  Age: 22 y.o. Sex: male      Admit Date: 5/11/2020    LOS: 1 day     Subjective: The patient has been neurologically stable overnight however he is very uncomfortable in terms of his headache and nuchal rigidity. We are awaiting MRI    Objective:     Vitals:    05/11/20 2359 05/12/20 0318 05/12/20 0751 05/12/20 1028   BP: 119/81 116/61 124/71 124/74   Pulse: 75 63 60 65   Resp: 18 20 18 18   Temp: 98.6 °F (37 °C) 99.5 °F (37.5 °C) 98.6 °F (37 °C) 98.6 °F (37 °C)   SpO2: 98%  100% 99%        Intake and Output:  Current Shift: No intake/output data recorded. Last three shifts: 05/10 1901 - 05/12 0700  In: -   Out: 300 [Urine:300]    Physical Exam:   The patient is alert cooperative and orientedHe appears acutely uncomfortable. Cranial nerve examination normal visual fields. Normal random eye movements. No ptosis. No lid lag  Face moves strongly symmetrically and equally  Speech is normal  Motor control of the upper extremities is normal.  Lower extremity strength gross motor is normal  Fine motor coordination is normal    Peripheral sensation light touch normal  The deep tendon reflexes are not hyperactive. Lab/Data Review:  Recent Results (from the past 24 hour(s))   CBC WITH AUTOMATED DIFF    Collection Time: 05/12/20  6:36 AM   Result Value Ref Range    WBC 8.6 4.3 - 11.1 K/uL    RBC 5.27 4.23 - 5.6 M/uL    HGB 15.3 13.6 - 17.2 g/dL    HCT 46.4 41.1 - 50.3 %    MCV 88.0 79.6 - 97.8 FL    MCH 29.0 26.1 - 32.9 PG    MCHC 33.0 31.4 - 35.0 g/dL    RDW 12.4 11.9 - 14.6 %    PLATELET 647 751 - 385 K/uL    MPV 10.7 9.4 - 12.3 FL    ABSOLUTE NRBC 0.00 0.0 - 0.2 K/uL    DF AUTOMATED      NEUTROPHILS 67 43 - 78 %    LYMPHOCYTES 20 13 - 44 %    MONOCYTES 11 4.0 - 12.0 %    EOSINOPHILS 1 0.5 - 7.8 %    BASOPHILS 1 0.0 - 2.0 %    IMMATURE GRANULOCYTES 0 0.0 - 5.0 %    ABS.  NEUTROPHILS 5.8 1.7 - 8.2 K/UL ABS. LYMPHOCYTES 1.7 0.5 - 4.6 K/UL    ABS. MONOCYTES 1.0 0.1 - 1.3 K/UL    ABS. EOSINOPHILS 0.1 0.0 - 0.8 K/UL    ABS. BASOPHILS 0.1 0.0 - 0.2 K/UL    ABS. IMM. GRANS. 0.0 0.0 - 0.5 K/UL   METABOLIC PANEL, BASIC    Collection Time: 05/12/20  6:36 AM   Result Value Ref Range    Sodium 138 136 - 145 mmol/L    Potassium 3.9 3.5 - 5.1 mmol/L    Chloride 105 98 - 107 mmol/L    CO2 27 21 - 32 mmol/L    Anion gap 6 (L) 7 - 16 mmol/L    Glucose 98 65 - 100 mg/dL    BUN 6 6 - 23 MG/DL    Creatinine 0.98 0.8 - 1.5 MG/DL    GFR est AA >60 >60 ml/min/1.73m2    GFR est non-AA >60 >60 ml/min/1.73m2    Calcium 8.9 8.3 - 10.4 MG/DL   HIV 1/2 AG/AB, 4TH GENERATION,W RFLX CONFIRM    Collection Time: 05/12/20 11:21 AM   Result Value Ref Range    HIV 1/2 Interpretation NONREACTIVE NR      HIV 1/2 result comment SEE NOTE (A) NR            Assessment:   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 MRI will be helpful.   He will clearly need to be closely followed in terms of how the condition evolves as to whether this is a uni-phasic viral mediated illness or initial presentation of a autoimmune disorder  In the event of the latter being a possibility on MRI 1 would consider the usage of corticosteroids      Plan:     Await MRI    Signed By: Randi Tenorio MD     May 12, 2020

## 2020-05-12 NOTE — PROGRESS NOTES
Re-evaluated due to weakness, hit head on door hook due to being unsteady, seems weaker on left UE and LE, some edema over left eye, discussed proceeding with mri brain with RN and hope to have result soon  Karlene Garcia MD

## 2020-05-12 NOTE — PROGRESS NOTES
Patient back from MRI. Assisted patient to bathroom and back to bed. No worsening in any signs or symptoms at this time. Call light in reach. Bed low and locked. Spoke with mom several times today as they face timed. Dr. Belkys Bradley did as well.

## 2020-05-12 NOTE — PROGRESS NOTES
Paged Dr. Hardin discussed a change in patient condition with regards to weakness. Asked if she would come evaluate when she could. Also informed her about the patient hitting his head on the bathroom hook and confirmed with patient that this happened prior to him receiving pain medication. Patient to now be bedrest and use urinal and to call for assistance before trying to get up. Call light within reach. Bed low and locked.

## 2020-05-12 NOTE — PROGRESS NOTES
TRANSFER - IN REPORT:    Verbal report received from [facility] [place] RN(name) on [address]  being received from 831(unit) for change in patient condition(isolation)      Report consisted of patients Situation, Background, Assessment and   Recommendations(SBAR). Information from the following report(s) SBAR and Kardex was reviewed with the receiving nurse. Opportunity for questions and clarification was provided. Assessment completed upon patients arrival to unit and care assumed.

## 2020-05-12 NOTE — PROGRESS NOTES
Remains inpatient. MO team to be assigned post discharge. MO episode open. This note will not be viewable in [address]

## 2020-05-12 NOTE — PROGRESS NOTES
Received patient from 8th floor. Only complaints are headache at this time. No signs or symptoms of distress noted. Bed low and locked and call light within reach.

## 2020-05-12 NOTE — CONSULTS
Infectious Disease Consult    Impression:   · HSV 2 meningitis  · HSV 2 genital lesions  · Eczema    Plan:   · Continue IV acyclovir 10mg/kg q8h for now  · Ok to discontinue other antibiotics  · Can likely transition to PO when ready for discharge to complete 14 days of treatment - valacyclovir would be preferred    Anti-infectives:   1. Acylcovir 5/11-present  2. Ampicillin 5/11-present  3. Ceftriaxone 5/11-present  4. Vancomycin 5//1-present    Subjective:   Date of Consultation:  May 12, 2020  Date of Admission: 5/11/2020   Referring Physician: Sheila Yon    Patient is a 22 y.o. male without significant PMH who presented yesterday for complaints of fever/headache. He tells me that his headache started on Saturday and has been consistent since that time. He also has neck pain, cannot put his chin to his chest and is very stiff posteriorly. He noted new, painful sores on the distal penis that have started to erupt in the last few days. He's never had this before. He has sex with women, inconsistently uses condoms, no history of prior STD to his knowledge. He underwent LP on presentation with 74 WBC (89% lymphs), protein 89, glucose 57. HSV returned positive on PCR panel. He was febrile on arrival with slight leukocytosis, both of which are improving. He was started empirically on amp/ctx/vanc/acyclovir. Patient Active Problem List   Diagnosis Code    Eczema L30.9    Meningitis G03.9     Past Medical History:   Diagnosis Date    Meningitis 5/11/2020    Other ill-defined conditions(799.89)     eczema      Family History   Problem Relation Age of Onset    No Known Problems Mother     No Known Problems Father       Social History     Tobacco Use    Smoking status: Never Smoker    Smokeless tobacco: Never Used   Substance Use Topics    Alcohol use: Yes     Comment: OCC     No past surgical history on file.    Prior to Admission medications    Not on File     No Known Allergies     Review of Systems:  A comprehensive review of systems was negative except for that written in the History of Present Illness. Objective:   Blood pressure 124/71, pulse 60, temperature 98.6 °F (37 °C), resp. rate 18, SpO2 100 %. Temp (24hrs), Av °F (37.8 °C), Min:98.6 °F (37 °C), Max:101.7 °F (38.7 °C)       Exam:    General:  Alert, cooperative, well noursished, well developed, appears stated age   Eyes:  Conjunctiva injected but no drainage. Mouth/Throat: Mucous membranes normal, oral pharynx clear   Neck: Stiff, limited ROM   Lungs:   Clear to auscultation bilaterally, good effort   CV:  Regular rate and rhythm,no murmur, click, rub or gallop   Abdomen:   Soft, non-tender.  bowel sounds normal. non-distended   Extremities: No cyanosis or edema   Skin: Ulcerated lesions on the distal penis under the frenulum   Lymph nodes: Cervical and supraclavicular normal   Musculoskeletal: No swelling or deformity   Lines/Devices:  Intact, no erythema, drainage or tenderness   Psych: Alert and oriented, normal mood affect given the setting       Data Review:   Recent Results (from the past 24 hour(s))   CELL COUNT, BODY FLUID    Collection Time: 20  9:05 AM   Result Value Ref Range    BODY FLUID TYPE CEREBROSPINAL FLUID      FLUID COLOR CLEAR      FLUID APPEARANCE COLORLESS      FLUID RBC CT. 7 /cu mm    FLUID WBC COUNT 92 /cu mm    FLD NEUTROPHILS 1 %    FLD LYMPHS 92 %    FLD MONO/MACROPHAGES 7 %   CULTURE, CSF W GRAM STAIN    Collection Time: 20  9:05 AM   Result Value Ref Range    Special Requests: NO SPECIAL REQUESTS      GRAM STAIN 2 TO 5 WBC'S/OIF     GRAM STAIN NO DEFINITE ORGANISM SEEN      Culture result: NO GROWTH 1 DAY     PROTEIN, CSF    Collection Time: 20  9:05 AM   Result Value Ref Range    Tube No. 2      Protein,CSF 89 (H) 15 - 45 MG/DL   GLUCOSE, CSF    Collection Time: 20  9:05 AM   Result Value Ref Range    Tube No. 2      Glucose,CSF 57 40 - 70 MG/DL   MENINGITIS PATHOGENS PANEL, CSF (BY PCR) Collection Time: 05/11/20  9:05 AM   Result Value Ref Range    Meningitis panel RESULTS SCANNED IN [facility]     CELL COUNT, BODY FLUID    Collection Time: 05/11/20  9:05 AM   Result Value Ref Range    BODY FLUID TYPE CEREBROSPINAL FLUID      FLUID COLOR CLEAR      FLUID APPEARANCE COLORLESS      FLUID RBC CT. 11 /cu mm    FLUID WBC COUNT 74 /cu mm    FLD NEUTROPHILS 1 %    FLD LYMPHS 89 %    FLD MONO/MACROPHAGES 10 %   MENINGITIS PATHOGENS PANEL, CSF (BY PCR)    Collection Time: 05/11/20  9:08 AM   Result Value Ref Range    Meningitis panel RESULTS SCANNED IN [facility]     CULTURE, BLOOD    Collection Time: 05/11/20 11:49 AM   Result Value Ref Range    Special Requests: LEFT  Antecubital        Culture result: NO GROWTH AFTER 19 HOURS     CULTURE, BLOOD    Collection Time: 05/11/20 11:51 AM   Result Value Ref Range    Special Requests: RIGHT  Antecubital        Culture result: NO GROWTH AFTER 19 HOURS     CBC WITH AUTOMATED DIFF    Collection Time: 05/12/20  6:36 AM   Result Value Ref Range    WBC 8.6 4.3 - 11.1 K/uL    RBC 5.27 4.23 - 5.6 M/uL    HGB 15.3 13.6 - 17.2 g/dL    HCT 46.4 41.1 - 50.3 %    MCV 88.0 79.6 - 97.8 FL    MCH 29.0 26.1 - 32.9 PG    MCHC 33.0 31.4 - 35.0 g/dL    RDW 12.4 11.9 - 14.6 %    PLATELET 394 719 - 094 K/uL    MPV 10.7 9.4 - 12.3 FL    ABSOLUTE NRBC 0.00 0.0 - 0.2 K/uL    DF AUTOMATED      NEUTROPHILS 67 43 - 78 %    LYMPHOCYTES 20 13 - 44 %    MONOCYTES 11 4.0 - 12.0 %    EOSINOPHILS 1 0.5 - 7.8 %    BASOPHILS 1 0.0 - 2.0 %    IMMATURE GRANULOCYTES 0 0.0 - 5.0 %    ABS. NEUTROPHILS 5.8 1.7 - 8.2 K/UL    ABS. LYMPHOCYTES 1.7 0.5 - 4.6 K/UL    ABS. MONOCYTES 1.0 0.1 - 1.3 K/UL    ABS. EOSINOPHILS 0.1 0.0 - 0.8 K/UL    ABS. BASOPHILS 0.1 0.0 - 0.2 K/UL    ABS. IMM.  GRANS. 0.0 0.0 - 0.5 K/UL   METABOLIC PANEL, BASIC    Collection Time: 05/12/20  6:36 AM   Result Value Ref Range    Sodium 138 136 - 145 mmol/L    Potassium 3.9 3.5 - 5.1 mmol/L    Chloride 105 98 - 107 mmol/L    CO2 27 21 - 32 mmol/L    Anion gap 6 (L) 7 - 16 mmol/L    Glucose 98 65 - 100 mg/dL    BUN 6 6 - 23 MG/DL    Creatinine 0.98 0.8 - 1.5 MG/DL    GFR est AA >60 >60 ml/min/1.73m2    GFR est non-AA >60 >60 ml/min/1.73m2    Calcium 8.9 8.3 - 10.4 MG/DL        Microbiology:    All Micro Results     Procedure Component Value Units Date/Time    MENINGITIS PATHOGENS PANEL, CSF (BY PCR) [[id number]] Collected:  05/11/20 0908    Order Status:  Completed Specimen:  Cerebrospinal Fluid Updated:  05/12/20 0829     Meningitis panel       RESULTS SCANNED IN [facility]          EMERGENT DISEASE PANEL [[id number]] Collected:  05/11/20 0908    Order Status:  Completed Updated:  05/12/20 0824          Studies:    5/11 CTH  IMPRESSION:     NO ACUTE INTRACRANIAL ABNORMALITY IDENTIFIED AT NONCONTRAST CT.     Signed By: Stephon Naylor MD     May 12, 2020

## 2020-05-12 NOTE — PROGRESS NOTES
Pt transferred from [facility] for for rule-out Covid 19  Chart screened by  for discharge planning. No needs identified at this time. Please consult  if any new issues arise  Case management will continue to follow. Care Management Interventions  PCP Verified by CM:  Yes  Mode of Transport at Discharge: [address] (CM Consult): Discharge Planning  Discharge Durable Medical Equipment: No  Physical Therapy Consult: No  Occupational Therapy Consult: No  Speech Therapy Consult: No  Current Support Network: Relative's Home(mother [address] [place] [address] [phone])  Confirm Follow Up Transport: Family  The Plan for Transition of Care is Related to the Following Treatment Goals : no needs  The Patient and/or Patient Representative was Provided with a Choice of Provider and Agrees with the Discharge Plan?: Yes  Freedom of Choice List was Provided with Basic Dialogue that Supports the Patient's Individualized Plan of Care/Goals, Treatment Preferences and Shares the Quality Data Associated with the Providers?: Yes   Resource Information Provided?: No  Discharge Location  Discharge Placement: Home

## 2020-05-12 NOTE — PROGRESS NOTES
Hospitalist Note     Admit Date:  2020  2:31 PM   Name:  Spencer Mari   Age:  22 y.o.  :  2000   MRN:  [id number]   PCP:  Darrell Flaherty MD  Treatment Team: Attending Provider: Zeb Closs, MD; Consulting Provider: Kathy Alexander MD; Consulting Provider: Sam Whiting MD; Primary Nurse: Lashell Vallejo RN; Care Manager: Stephen Nguyen RN    HPI/Subjective:     Mr. Salinas Florez is a 23 yo male without PMH who is admitted with fever and headache concerning for meningitis. He had LP in the ED showing elevated protein and WBC. Cultures and meningitis panel pending. Neurology consulted. CT head negative. MRI brain pending. He has been on antibiotics and antivirals since admit. He was transferred to 5th floor for COVID testing on 20 that is pending. 20 discussed with mother via face time on rounds, has headache and feels as if his left face is swollen, has nausea and anorexia,       Objective:     Patient Vitals for the past 24 hrs:   Temp Pulse Resp BP SpO2   20 1524 98.8 °F (37.1 °C) 77 18 116/73 97 %   20 1028 98.6 °F (37 °C) 65 18 124/74 99 %   20 0751 98.6 °F (37 °C) 60 18 124/71 100 %   20 0318 99.5 °F (37.5 °C) 63 20 116/61    20 2359 98.6 °F (37 °C) 75 18 119/81 98 %   20 2040 99.6 °F (37.6 °C) 81 18 122/76 98 %   20 1840 (!) 100.6 °F (38.1 °C)         Oxygen Therapy  O2 Sat (%): 97 % (20 1524)  O2 Device: Room air (20 0751)    Estimated body mass index is 19.25 kg/m² as calculated from the following:    Height as of an earlier encounter on 20: 5' 11\" (1.803 m). Weight as of an earlier encounter on 20: 62.6 kg (138 lb).       Intake/Output Summary (Last 24 hours) at 2020 1540  Last data filed at 2020 2113  Gross per 24 hour   Intake    Output 300 ml   Net -300 ml       *Note that automatically entered I/Os may not be accurate; dependent on patient compliance with collection and accurate  by [employer]. General:    Well nourished. Alert. No distress  CV:   RRR. No murmur, rub, or gallop. No edema  Lungs:   CTAB. No wheezing, rhonchi, or rales. Abdomen:   Soft, nontender, nondistended. Extremities: Warm and dry. Skin:     No rashes or jaundice. Neuro:  No gross focal deficits    Data Review:  I have reviewed all labs, meds, and studies from the last 24 hours:  Recent Results (from the past 24 hour(s))   CBC WITH AUTOMATED DIFF    Collection Time: 05/12/20  6:36 AM   Result Value Ref Range    WBC 8.6 4.3 - 11.1 K/uL    RBC 5.27 4.23 - 5.6 M/uL    HGB 15.3 13.6 - 17.2 g/dL    HCT 46.4 41.1 - 50.3 %    MCV 88.0 79.6 - 97.8 FL    MCH 29.0 26.1 - 32.9 PG    MCHC 33.0 31.4 - 35.0 g/dL    RDW 12.4 11.9 - 14.6 %    PLATELET 329 616 - 751 K/uL    MPV 10.7 9.4 - 12.3 FL    ABSOLUTE NRBC 0.00 0.0 - 0.2 K/uL    DF AUTOMATED      NEUTROPHILS 67 43 - 78 %    LYMPHOCYTES 20 13 - 44 %    MONOCYTES 11 4.0 - 12.0 %    EOSINOPHILS 1 0.5 - 7.8 %    BASOPHILS 1 0.0 - 2.0 %    IMMATURE GRANULOCYTES 0 0.0 - 5.0 %    ABS. NEUTROPHILS 5.8 1.7 - 8.2 K/UL    ABS. LYMPHOCYTES 1.7 0.5 - 4.6 K/UL    ABS. MONOCYTES 1.0 0.1 - 1.3 K/UL    ABS. EOSINOPHILS 0.1 0.0 - 0.8 K/UL    ABS. BASOPHILS 0.1 0.0 - 0.2 K/UL    ABS. IMM.  GRANS. 0.0 0.0 - 0.5 K/UL   METABOLIC PANEL, BASIC    Collection Time: 05/12/20  6:36 AM   Result Value Ref Range    Sodium 138 136 - 145 mmol/L    Potassium 3.9 3.5 - 5.1 mmol/L    Chloride 105 98 - 107 mmol/L    CO2 27 21 - 32 mmol/L    Anion gap 6 (L) 7 - 16 mmol/L    Glucose 98 65 - 100 mg/dL    BUN 6 6 - 23 MG/DL    Creatinine 0.98 0.8 - 1.5 MG/DL    GFR est AA >60 >60 ml/min/1.73m2    GFR est non-AA >60 >60 ml/min/1.73m2    Calcium 8.9 8.3 - 10.4 MG/DL   HIV 1/2 AG/AB, 4TH GENERATION,W RFLX CONFIRM    Collection Time: 05/12/20 11:21 AM   Result Value Ref Range    HIV 1/2 Interpretation NONREACTIVE NR      HIV 1/2 result comment SEE NOTE (A) NR          Current Meds:  Current Facility-Administered Medications   Medication Dose Route Frequency    0.9% sodium chloride infusion  100 mL/hr IntraVENous CONTINUOUS    naloxone (NARCAN) injection 0.4 mg  0.4 mg IntraVENous PRN    diphenhydrAMINE (BENADRYL) capsule 25 mg  25 mg Oral Q4H PRN    ondansetron (ZOFRAN) injection 4 mg  4 mg IntraVENous Q4H PRN    senna-docusate (PERICOLACE) 8.6-50 mg per tablet 2 Tab  2 Tab Oral DAILY PRN    HYDROmorphone (PF) (DILAUDID) injection 1 mg  1 mg IntraVENous Q4H PRN    acyclovir (ZOVIRAX) 650 mg in 0.9% sodium chloride 100 mL IVPB  10 mg/kg IntraVENous Q8H    HYDROcodone-acetaminophen (NORCO) 5-325 mg per tablet 1 Tab  1 Tab Oral Q4H PRN    acetaminophen (TYLENOL) tablet 650 mg  650 mg Oral Q6H PRN       Other Studies:  No results found for this visit on 05/11/20. No results found.     All Micro Results     Procedure Component Value Units Date/Time    CULTURE, BLOOD [[id number]] Collected:  05/12/20 1119    Order Status:  Completed Specimen:  Blood Updated:  05/12/20 1146    CULTURE, BLOOD [[id number]] Collected:  05/12/20 1121    Order Status:  Completed Specimen:  Blood Updated:  05/12/20 1146    MENINGITIS PATHOGENS PANEL, CSF (BY PCR) [[id number]] Collected:  05/11/20 0908    Order Status:  Completed Specimen:  Cerebrospinal Fluid Updated:  05/12/20 0829     Meningitis panel       RESULTS SCANNED IN [facility]          EMERGENT DISEASE PANEL [[id number]] Collected:  05/11/20 0908    Order Status:  Completed Updated:  05/12/20 0824          SARS-CoV-2 Lab Results  \"Novel Coronavirus\" Test: No results found for: COV2NT   \"Emergent Disease\" Test: No results found for: EDPR  \"SARS-COV-2\" Test: No results found for: XGCOVT  As of: 3:40 PM on 5/12/2020        Assessment and Plan:     Hospital Problems as of 5/12/2020 Date Reviewed: 6/10/2017          Codes Class Noted - Resolved POA    Meningitis ICD-10-CM: G03.9  ICD-9-CM: 322.9  5/11/2020 - Present Unknown              Plan:  · Suspected HSV2 meningitis:  · Consult ID  · Add meningitis panel to CSF  · Defer anti-infectives to ID  · Neuro following   · Pending MRI brain   · Supportive care   · followup COVID testing     DC planning/Dispo:  Pending to home      Diet:  DIET REGULAR  DVT ppx:  SCD    Signed:  Anastacia Locke MD

## 2020-05-12 NOTE — PROGRESS NOTES
Initial contact with pt by phone, as he is in isolation. Pt was very receptive to 's support, and shared that he was \"feeling a little better. \"  He acknowledged that it is difficult not to have family present, and stated that he would have his mother here, if she was allowed. Pt ws receptive to prayer, and  offered prayer for pt's encouragement/healing. Pt expressed his appreciation for call.     Aura Wilson MDiv, [address] [place] [address]

## 2020-05-13 LAB — EMERGENT DISEASE PANEL, EDPR: NOT DETECTED

## 2020-05-13 PROCEDURE — 74011250636 HC RX REV CODE- 250/636: Performed by: FAMILY MEDICINE

## 2020-05-13 PROCEDURE — 65270000029 HC RM PRIVATE

## 2020-05-13 PROCEDURE — 99232 SBSQ HOSP IP/OBS MODERATE 35: CPT | Performed by: PSYCHIATRY & NEUROLOGY

## 2020-05-13 PROCEDURE — 74011250636 HC RX REV CODE- 250/636: Performed by: INTERNAL MEDICINE

## 2020-05-13 PROCEDURE — 74011000258 HC RX REV CODE- 258: Performed by: FAMILY MEDICINE

## 2020-05-13 RX ADMIN — ACYCLOVIR SODIUM 650 MG: 50 INJECTION, SOLUTION INTRAVENOUS at 05:24

## 2020-05-13 RX ADMIN — SODIUM CHLORIDE 100 ML/HR: 900 INJECTION, SOLUTION INTRAVENOUS at 16:50

## 2020-05-13 RX ADMIN — ACYCLOVIR SODIUM 650 MG: 50 INJECTION, SOLUTION INTRAVENOUS at 21:50

## 2020-05-13 RX ADMIN — ACYCLOVIR SODIUM 650 MG: 50 INJECTION, SOLUTION INTRAVENOUS at 15:12

## 2020-05-13 RX ADMIN — SODIUM CHLORIDE 100 ML/HR: 900 INJECTION, SOLUTION INTRAVENOUS at 02:15

## 2020-05-13 NOTE — PROGRESS NOTES
Messaged pharm twice and called pharm regarding IV acyclovir. It has never came up. I have checked everywhere.  Was told they would tube another one and have still yet to receive it

## 2020-05-13 NOTE — PROGRESS NOTES
Attempted follow-up phone call to provide spiritual/emotional support, as pt is in isolation. Pt did not answer the phone. Continue to offer spiritual care, as needed.     Abby Paredes MDiv, [address] [place] [address]

## 2020-05-13 NOTE — PROGRESS NOTES
[facility] Pt [facility] to 8[facility]. 1328 Report given to Narinder, [place].

## 2020-05-13 NOTE — PROGRESS NOTES
TRANSFER - OUT REPORT:    Verbal report given to Quang HANSEN(name) on Misti Hall  being transferred to 830(unit) for routine progression of care       Report consisted of patients Situation, Background, Assessment and   Recommendations(SBAR). Information from the following report(s) SBAR and Kardex was reviewed with the receiving nurse. Lines:   Peripheral IV 05/11/20 Left (Active)   Site Assessment Clean, dry, & intact 5/13/2020  7:35 AM   Phlebitis Assessment 0 5/13/2020  7:35 AM   Infiltration Assessment 0 5/13/2020  7:35 AM   Dressing Status Clean, dry, & intact 5/13/2020  7:35 AM   Dressing Type Tape;Transparent 5/13/2020  7:35 AM   Hub Color/Line Status Pink; Infusing 5/13/2020  7:35 AM       Peripheral IV Anterior;Proximal;Right Forearm (Active)   Site Assessment Clean, dry, & intact 5/13/2020  7:35 AM   Phlebitis Assessment 0 5/13/2020  7:35 AM   Infiltration Assessment 0 5/13/2020  7:35 AM   Dressing Status Clean, dry, & intact 5/13/2020  7:35 AM   Dressing Type Tape;Transparent 5/13/2020  7:35 AM   Hub Color/Line Status Green;Flushed 5/13/2020  7:35 AM        Opportunity for questions and clarification was provided.       Patient transported with:   [employer]

## 2020-05-13 NOTE — PROGRESS NOTES
Pt is being transferred to 8th floor CoVid result are Negative. Case Management on 8th floor will follow.

## 2020-05-13 NOTE — PROGRESS NOTES
Patient resting in bed. Respirations present, non-labored. Room air. No signs of distress noted. PIV infusing w/o difficulty. Denies needs at this time. BLE, BUE weakness noted. Will continue to monitor.

## 2020-05-13 NOTE — PROGRESS NOTES
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 Acylcovir -present  2. Ampicillin -present  3. Ceftriaxone -present  4. Vancomycin -present    Subjective:   Feeling better today. Headache improved. Remains afebrile. No Known Allergies     Review of Systems:  A comprehensive review of systems was negative except for that written in the History of Present Illness. Objective:   Blood pressure 102/65, pulse 73, temperature 99.5 °F (37.5 °C), resp. rate 18, SpO2 97 %. Temp (24hrs), Av.8 °F (37.1 °C), Min:98.4 °F (36.9 °C), Max:99.5 °F (37.5 °C)     Exam performed  and unchanged except as noted below    Exam:    General:  Alert, cooperative, well noursished, well developed, appears stated age   Eyes:  Conjunctiva injected but no drainage. Mouth/Throat: Mucous membranes normal, oral pharynx clear   Neck: Stiff, limited ROM   Lungs:   Clear to auscultation bilaterally, good effort   CV:  Regular rate and rhythm,no murmur, click, rub or gallop   Abdomen:   Soft, non-tender.  bowel sounds normal. non-distended   Extremities: No cyanosis or edema   Skin: Ulcerated lesions on the distal penis under the frenulum   Lymph nodes: Cervical and supraclavicular normal   Musculoskeletal: No swelling or deformity   Lines/Devices:  Intact, no erythema, drainage or tenderness   Psych: Alert and oriented, normal mood affect given the setting       Data Review:   Recent Results (from the past 24 hour(s))   HIV 1/2 AG/AB, 4TH GENERATION,W RFLX CONFIRM    Collection Time: 20 11:21 AM   Result Value Ref Range    HIV 1/2 Interpretation NONREACTIVE NR      HIV 1/2 result comment SEE NOTE (A) NR          Microbiology:    All Micro Results     Procedure Component Value Units Date/Time    CULTURE, BLOOD [[id number]] Collected:  05/12/20 1119    Order Status:  Completed Specimen:  Blood Updated:  05/12/20 1146    CULTURE, BLOOD [[id number]] Collected:  05/12/20 1121    Order Status:  Completed Specimen:  Blood Updated:  05/12/20 1146    MENINGITIS PATHOGENS PANEL, CSF (BY PCR) [[id number]] Collected:  05/11/20 0908    Order Status:  Completed Specimen:  Cerebrospinal Fluid Updated:  05/12/20 0829     Meningitis panel       RESULTS SCANNED IN [facility]          EMERGENT DISEASE PANEL [[id number]] Collected:  05/11/20 0908    Order Status:  Completed Updated:  05/12/20 0824          Studies:    5/11 MRI brain  IMPRESSION: A 5 mm and a 2 mm nonspecific, nonenhancing FLAIR white matter  hyperintensity both in the right frontal lobe. These can be seen with migraine  headaches, remote inflammation and demyelination. No acute abnormality. 5/11 CTH  IMPRESSION:     NO ACUTE INTRACRANIAL ABNORMALITY IDENTIFIED AT NONCONTRAST CT.     Signed By: Luciano Machado MD     May 13, 2020

## 2020-05-13 NOTE — PROGRESS NOTES
Tele-neurology follow-up evaluation  This visit was performed via telemedicine in order to preserve PPE  Patient substantially improved with much improved headache. Improved neck nuchal rigidity. No seizures  Review of systems continues to have some degree of weakness in the lower extremities  Physical exam  Bright and alert much more comfortable nuchal rigidity is essentially dissipated with normal movement of the head and neck identified next the camera  There are no cranial nerve signs he has full range of extraocular movements no ptosis normal facial movement and sensation  Looking substantially improved    MRI reviewed on the PACS system  Note the size of the areas of increased are of no concern to me. Specifically I am not at this point concerned about Behcet's which was added to the differential out of academic completeness.   Treatment  Can continue IV acyclovir and would concur with switch over to valacyclovir oral once he is an outpatient will see on request as improving

## 2020-05-13 NOTE — PROGRESS NOTES
Hospitalist Note     Admit Date:  2020  2:31 PM   Name:  Christopher Greenberg   Age:  22 y.o.  :  2000   MRN:  [id number]   PCP:  Leeroy Davis MD  Treatment Team: Attending Provider: Aarti Ferguson MD; Consulting Provider: Yves Goldman MD; Consulting Provider: Torin Miller MD; Utilization Review: Arjun Chavis RN    HPI/Subjective:     Mr. Traci Dowling is a 21 yo male without PMH who is admitted with fever and headache concerning for meningitis. He had LP in the ED showing elevated protein and WBC. Cultures and meningitis panel pending. Neurology/ID consulted. CT head negative. MRI brain shows 2 areas of FLAIR hyperintensity right frontal lobe. Antibiotics stopped and antivirals continued since admit. He was transferred to 5th floor for COVID testing on 20 that is negative. 20 feeling improved, less nauseated and less headache, still weak and wobbly, grandmother updated via face time during visit       Objective:     Patient Vitals for the past 24 hrs:   Temp Pulse Resp BP SpO2   20 1210 98.4 °F (36.9 °C) 65 14 119/84 100 %   20 1048 99 °F (37.2 °C) 64 18 109/69 98 %   20 0716 99.5 °F (37.5 °C) 73 18 102/65 97 %   20 0259 98.5 °F (36.9 °C) 64 19 126/69 98 %   20 2253 98.4 °F (36.9 °C) 73 19 114/72 98 %   20 2008 99.1 °F (37.3 °C) 68 19 110/69 98 %   20 1524 98.8 °F (37.1 °C) 77 18 116/73 97 %     Oxygen Therapy  O2 Sat (%): 100 % (20 1210)  O2 Device: Room air (20 0716)    Estimated body mass index is 19.53 kg/m² as calculated from the following:    Height as of 20: 5' 11\" (1.803 m). Weight as of 20: 63.5 kg (140 lb).       Intake/Output Summary (Last 24 hours) at 2020 1302  Last data filed at 2020 0855  Gross per 24 hour   Intake 540 ml   Output 900 ml   Net -360 ml       *Note that automatically entered I/Os may not be accurate; dependent on patient compliance with collection and accurate data entry by [employer]. General:    Well nourished. Alert. No distress  CV:   RRR. No murmur, rub, or gallop. No edema  Lungs:   CTAB. No wheezing, rhonchi, or rales. anterior  Abdomen:   Soft, nontender, nondistended. Extremities: Warm and dry. Skin:     No rashes or jaundice. Neuro:  No gross focal deficits    Data Review:  I have reviewed all labs, meds, and studies from the last 24 hours:  No results found for this or any previous visit (from the past 24 hour(s)). Current Meds:  Current Facility-Administered Medications   Medication Dose Route Frequency    0.9% sodium chloride infusion  100 mL/hr IntraVENous CONTINUOUS    naloxone (NARCAN) injection 0.4 mg  0.4 mg IntraVENous PRN    diphenhydrAMINE (BENADRYL) capsule 25 mg  25 mg Oral Q4H PRN    ondansetron (ZOFRAN) injection 4 mg  4 mg IntraVENous Q4H PRN    senna-docusate (PERICOLACE) 8.6-50 mg per tablet 2 Tab  2 Tab Oral DAILY PRN    HYDROmorphone (PF) (DILAUDID) injection 1 mg  1 mg IntraVENous Q4H PRN    acyclovir (ZOVIRAX) 650 mg in 0.9% sodium chloride 100 mL IVPB  10 mg/kg IntraVENous Q8H    HYDROcodone-acetaminophen (NORCO) 5-325 mg per tablet 1 Tab  1 Tab Oral Q4H PRN    acetaminophen (TYLENOL) tablet 650 mg  650 mg Oral Q6H PRN       Other Studies:  No results found for this visit on 05/11/20. Mri Brain W Wo Cont    Result Date: 5/12/2020  MRI BRAIN WITHOUT and WITH CONTRAST. HISTORY: Suspected COVID. Unsteady gait and meningitis. COMPARISON:  None. Study performed within 24 hours of admission. TECHNIQUE:  Sagittal T1, axial T1, T2, FLAIR, gradient echo, diffusion with ADC map were followed by 12cc intravenous gadolinium after which axial and coronal T1 images were repeated. Intravenous contrast was given to increase specificity of T2 abnormalities. FINDINGS: -Diffusion images: No any areas of restricted diffusion.  -No midline shift, mass or mass effect. -Gradient echo images: are unremarkable. -FLAIR sequence images:  A 5 mm FLAIR hyperintensity in the right frontal white matter. There is also a 2 mm FLAIR white matter hyperintensities without enhancement right frontal lobe. No contrast-enhancement. . -No evidence of acute hemorrhage.  -Lateral ventricles are: normal size.  -Pituitary and parasellar structures: unremarkable on the sagittal T1 images.  -There are normal T2 flow-voids in the major vessels.   -Posterior fossa structures are unremarkable.  -Basal ganglia: appear symmetric.  -Orbits: are grossly normal. -Paranasal sinuses: are clear. -Contrast: No enhancing nodules or masses. -Other: None. IMPRESSION: A 5 mm and a 2 mm nonspecific, nonenhancing FLAIR white matter hyperintensity both in the right frontal lobe. These can be seen with migraine headaches, remote inflammation and demyelination. No acute abnormality.        All Micro Results     Procedure Component Value Units Date/Time    CULTURE, BLOOD [[id number]] Collected:  05/12/20 1119    Order Status:  Completed Specimen:  Blood Updated:  05/12/20 1146    CULTURE, BLOOD [[id number]] Collected:  05/12/20 1121    Order Status:  Completed Specimen:  Blood Updated:  05/12/20 1146    MENINGITIS PATHOGENS PANEL, CSF (BY PCR) [[id number]] Collected:  05/11/20 0908    Order Status:  Completed Specimen:  Cerebrospinal Fluid Updated:  05/12/20 0829     Meningitis panel       RESULTS SCANNED IN [facility]          EMERGENT DISEASE PANEL [[id number]] Collected:  05/11/20 0908    Order Status:  Canceled           SARS-CoV-2 Lab Results  \"Novel Coronavirus\" Test: No results found for: COV2NT   \"Emergent Disease\" Test:   Lab Results   Component Value Date/Time    EDPR Not detected 05/11/2020 11:49 AM     \"SARS-COV-2\" Test: No results found for: XGCOVT  As of: 3:40 PM on 5/13/2020        Assessment and Plan:     Hospital Problems as of 5/13/2020 Date Reviewed: 6/10/2017          Codes Class Noted - Resolved POA    Meningitis ICD-10-CM: G03.9  ICD-9-CM: 322.9  5/11/2020 - Present Unknown              Plan:  · Suspected HSV2 meningitis:  · Defer anti-infectives to ID, currently on IV acyclovir  · Neuro following   · Supportive care     DC planning/Dispo:  Transfer to 8th floor, Pending to home      Diet:  DIET REGULAR  DVT ppx:  SCD    Signed:  Vik Tan MD

## 2020-05-14 VITALS
TEMPERATURE: 98.3 F | RESPIRATION RATE: 18 BRPM | SYSTOLIC BLOOD PRESSURE: 108 MMHG | OXYGEN SATURATION: 96 % | DIASTOLIC BLOOD PRESSURE: 73 MMHG | HEART RATE: 88 BPM

## 2020-05-14 PROBLEM — B00.3 MENINGITIS DUE TO HERPES SIMPLEX VIRUS: Status: ACTIVE | Noted: 2020-05-14

## 2020-05-14 PROCEDURE — 74011250636 HC RX REV CODE- 250/636: Performed by: FAMILY MEDICINE

## 2020-05-14 PROCEDURE — 74011250636 HC RX REV CODE- 250/636: Performed by: INTERNAL MEDICINE

## 2020-05-14 PROCEDURE — 74011000258 HC RX REV CODE- 258: Performed by: FAMILY MEDICINE

## 2020-05-14 RX ORDER — VALACYCLOVIR HYDROCHLORIDE 1 G/1
1000 TABLET, FILM COATED ORAL EVERY 8 HOURS
Qty: 42 TAB | Refills: 0 | Status: SHIPPED | OUTPATIENT
Start: 2020-05-14 | End: 2020-05-28

## 2020-05-14 RX ORDER — VALACYCLOVIR HYDROCHLORIDE 1 G/1
1000 TABLET, FILM COATED ORAL EVERY 8 HOURS
Qty: 42 TAB | Refills: 0 | Status: SHIPPED | OUTPATIENT
Start: 2020-05-14 | End: 2020-05-14 | Stop reason: SDUPTHER

## 2020-05-14 RX ADMIN — ACYCLOVIR SODIUM 650 MG: 50 INJECTION, SOLUTION INTRAVENOUS at 05:51

## 2020-05-14 RX ADMIN — SODIUM CHLORIDE 100 ML/HR: 900 INJECTION, SOLUTION INTRAVENOUS at 03:46

## 2020-05-14 NOTE — DISCHARGE INSTRUCTIONS
Patient Education        Bacterial Meningitis: Care Instructions  Your Care Instructions    Bacterial meningitis is an infection of the tissues that surround the brain and spinal cord. This serious infection can injure the brain. It can be life-threatening. How long it takes you to get better depends on how bad the illness is. It can take from just a couple of weeks to many months. You may have changes in how you think or concentrate. Most people with these symptoms get better over time. Be patient. Follow your doctor's instructions. Follow-up care is a key part of your treatment and safety. Be sure to make and go to all appointments, and call your doctor if you are having problems. It's also a good idea to know your test results and keep a list of the medicines you take. How can you care for yourself at home? · If your doctor prescribed antibiotics, take them as directed. Do not stop taking them just because you feel better. You need to take the full course of antibiotics. · Take an over-the-counter pain medicine, such as acetaminophen (Tylenol), ibuprofen (Advil, Motrin), or naproxen (Aleve) for pain or fever. Be safe with medicines. Read and follow all instructions on the label. · Do not take two or more pain medicines at the same time unless the doctor told you to. Many pain medicines have acetaminophen, which is Tylenol. Too much acetaminophen (Tylenol) can be harmful. · Get plenty of rest.  · Talk with your doctor about any new symptoms that develop, such as changes in your hearing or trouble concentrating. · Be sure that anyone who has come into close contact with you during this illness calls a doctor if he or she feels sick. When should you call for help? Call 911 anytime you think you may need emergency care. For example, call if:    · You have a seizure.    Call your doctor now or seek immediate medical care if:    · Your symptoms come back or get worse. These may include:  ? A fever. ?  A severe headache. ? A stiff neck. ? Nausea and vomiting.     · You have trouble thinking or concentrating.    Watch closely for changes in your health, and be sure to contact your doctor if:    · You do not get better as expected. Where can you learn more? Go to http://sunny-deng.info/  Enter M535 in the search box to learn more about \"Bacterial Meningitis: Care Instructions. \"  Current as of: January 26, 2020Content Version: 12.4  © 7334-7305 [employer]wise, Incorporated. Care instructions adapted under license by [employer] (which disclaims liability or warranty for this information). If you have questions about a medical condition or this instruction, always ask your healthcare professional. [address] any warranty or liability for your use of this information.

## 2020-05-14 NOTE — PROGRESS NOTES
Patient alert and oriented x 4. Respiration even and unlabored. Lung sounds clear. Bowel sounds active. Voiding without difficulty. Denies any headache, when moving neck side to side, up and down. Up ad chon. Denies any needs. No distress observed. Call light in reach. Bed in low position. Side rails up x 2. Instructed to call with any needs. Patient verbalized understanding.

## 2020-05-14 NOTE — PROGRESS NOTES
Follow-up phone call with patient who is in isolation. Pt stated that he was \"feeling much better and is hopeful to go home today. \"  Conveyed care/concern and expressed gratitude that pt is feeling better. Assured him of this 's prayers as he goes home. He anticipates that his mother will come to get him when he is discharged. Pt expressed appreciation for phone call and spiritual care.     Sonia Wood MDiv, [address] [place] [address]

## 2020-05-14 NOTE — PROGRESS NOTES
[address] May 14, 2020       RE: Yolie Stinson      To Whom It May Concern,    This is to certify that Yolie Stinson may return to work on Tuesday, May 19, 2020. Yolie Stinson tested negative for COVID-19. Please feel free to contact the hospital if you have any questions or concerns. Thank you for your assistance in this matter.       Sincerely,  Huma Morrell RN

## 2020-05-14 NOTE — PROGRESS NOTES
Patient called RN to room to look at right upper arm which has raised redness area that patient states \"hurts\". IV acyclovir stopped and line flushed with normal saline, and moved to new IV site. . Patient states does not hurt along vein just where raised area is in upper arm. Ice pack given for comfort. IV removed.  Will continue to monitor

## 2020-05-14 NOTE — DISCHARGE SUMMARY
Hospitalist Discharge Summary     Patient ID:  Kiki Mart  [id number]  39 y.o.  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 Álvaro Hoffman is a 23 yo male without PMH who is admitted with fever and headache concerning for meningitis. He had LP in the ED showing elevated protein and WBC. Cultures and meningitis panel pending. Neurology/ID consulted. CT head negative. MRI brain shows 2 areas of FLAIR hyperintensity right frontal lobe. Antibiotics stopped and antivirals continued since admit. He was transferred to 5th floor for COVID testing on 5-12-20 that is negative. Interval History (5/14): patient examined at bedside. No acute events overnight. He was seen ambulating the hallways using the railing as a guide, says that feels much better today but still weak in both his legs. No fevers/chills, chest pain, shortness of breath, or abdominal pain. Wants to go home. Hospital Course:  Please refer to the admission H&P for details of presentation. In summary, the patient is acute viral meningitis secondary to HSV-2 diagnosed via lumbar puncture. He was initiated placed on empiric antibiotics and IV acyclovir. ID consulted, recommend 14-day course of Valtrex 1 gm q8h after discharge. Neurlogy consulted with MRI brain showing two subcentimeter lesions, recommendation for medical management with antivirals. COVID screening completed and negative. He is currently hemodynamically stable for hospital discharge.  Details of hospitalization discussed with patient who understands and agrees with plan of care and discharge home. Return precautions provided. All questions answered. Significant Diagnostic Studies:     MRI BRAIN WITHOUT and WITH CONTRAST.     HISTORY: Suspected COVID. Unsteady gait and meningitis.     COMPARISON:  None. Study performed within 24 hours of admission.     TECHNIQUE:  Sagittal T1, axial T1, T2, FLAIR, gradient echo, diffusion with ADC  map were followed by 12cc intravenous gadolinium after which axial and coronal  T1 images were repeated. Intravenous contrast was given to increase specificity  of T2 abnormalities.       FINDINGS:   -Diffusion images: No any areas of restricted diffusion.    -No midline shift, mass or mass effect. -Gradient echo images: are unremarkable. -FLAIR sequence images: A 5 mm FLAIR hyperintensity in the right frontal white  matter. There is also a 2 mm FLAIR white matter hyperintensities without  enhancement right frontal lobe. No contrast-enhancement. .  -No evidence of acute hemorrhage.    -Lateral ventricles are: normal size.    -Pituitary and parasellar structures: unremarkable on the sagittal T1 images.    -There are normal T2 flow-voids in the major vessels.     -Posterior fossa structures are unremarkable.    -Basal ganglia: appear symmetric.    -Orbits: are grossly normal.   -Paranasal sinuses: are clear.  -Contrast: No enhancing nodules or masses.  -Other: None.     IMPRESSION  IMPRESSION: A 5 mm and a 2 mm nonspecific, nonenhancing FLAIR white matter  hyperintensity both in the right frontal lobe. These can be seen with migraine  headaches, remote inflammation and demyelination.  No acute abnormality.       Labs: Results:       Chemistry Recent Labs     05/12/20  0636   GLU 98      K 3.9      CO2 27   BUN 6   CREA 0.98   CA 8.9   AGAP 6*      CBC w/Diff Recent Labs     05/12/20  0636   WBC 8.6   RBC 5.27   HGB 15.3   HCT 46.4      GRANS 67   LYMPH 20   EOS 1      Cardiac Enzymes No results for input(s): CPK, CKND1, ARLEN in the last 72 hours.    No lab exists for component: CKRMB, TROIP   Coagulation No results for input(s): PTP, INR, APTT, INREXT in the last 72 hours. Lipid Panel No results found for: CHOL, CHOLPOCT, CHOLX, CHLST, CHOLV, 881243, HDL, HDLP, LDL, LDLC, DLDLP, 730166, VLDLC, VLDL, TGLX, TRIGL, TRIGP, TGLPOCT, CHHD, CHHDX   BNP No results for input(s): BNPP in the last 72 hours. Liver Enzymes No results for input(s): TP, ALB, TBIL, AP, SGOT, GPT in the last 72 hours. No lab exists for component: DBIL   Thyroid Studies No results found for: T4, T3U, TSH, TSHEXT         Discharge Exam:  Visit Vitals  /64 (BP 1 Location: Left arm, BP Patient Position: At rest)   Pulse 68   Temp 97.9 °F (36.6 °C)   Resp 16   SpO2 99%     General appearance: alert, cooperative, no distress, appears stated age  Lungs: clear to auscultation bilaterally  Heart: regular rate and rhythm, S1, S2 normal, no murmur, click, rub or gallop  Abdomen: soft, non-tender. Bowel sounds normal. No masses,  no organomegaly  Extremities: +4/5 muscle strength, seen ambulating hallway   Neurologic: Grossly normal    Disposition: home  Discharge Condition: stable  Patient Instructions:   Current Discharge Medication List      START taking these medications    Details   valACYclovir (VALTREX) 1 gram tablet Take 1 Tab by mouth every eight (8) hours for 14 days. Qty: 42 Tab, Refills: 0             Activity: Activity as tolerated  Diet: Resume previous diet  Wound Care: None needed    Follow-up  ·   With PCP as needed  Time spent to discharge patient 35 minutes  Signed:   Missy Crawford DO  5/14/2020  2:10 PM

## 2020-05-14 NOTE — PROGRESS NOTES
Infectious Disease Consult    Impression:   · HSV 2 meningitis  · HSV 2 genital lesions  · Eczema  · HIV negative    Plan:   · Continue IV acyclovir 10mg/kg q8h while in house  · Agree with transition to valacyclovir 1g q8h to complete 14 total days when he's ready for discharge  · No ID follow up required  · Patient counseled that meningitis can recur and that even when asymptomatic from a genital lesion standpoint he can still transmit HSV to partners, should engage in consistent condom use    Thank you for allowing us to participate in the care of this patient. ID will sign off at this time. Please don't hesitate to contact us with further questions or concerns. Anti-infectives:   1. Acylcovir -present  2. Ampicillin -present  3. Ceftriaxone -present  4. Vancomycin -present    Subjective:   Remains afebrile, ready for discharge home. Had some questions about exercises as lower extremities feel weak still but better. No Known Allergies     Review of Systems:  A comprehensive review of systems was negative except for that written in the History of Present Illness. Objective:   Blood pressure 121/62, pulse 67, temperature 98 °F (36.7 °C), resp. rate 17, SpO2 100 %. Temp (24hrs), Av.5 °F (36.9 °C), Min:98 °F (36.7 °C), Max:99.1 °F (37.3 °C)     Exam performed  and unchanged except as noted below    Exam:    General:  Alert, cooperative, well noursished, well developed, appears stated age   Eyes:  Conjunctiva injected but no drainage. Mouth/Throat: Mucous membranes normal, oral pharynx clear   Neck: Stiff, limited ROM   Lungs:   Clear to auscultation bilaterally, good effort   CV:  Regular rate and rhythm,no murmur, click, rub or gallop   Abdomen:   Soft, non-tender.  bowel sounds normal. non-distended   Extremities: No cyanosis or edema   Skin: Ulcerated lesions on the distal penis under the frenulum   Lymph nodes: Cervical and supraclavicular normal   Musculoskeletal: No swelling or deformity   Lines/Devices:  Intact, no erythema, drainage or tenderness   Psych: Alert and oriented, normal mood affect given the setting       Data Review:   No results found for this or any previous visit (from the past 24 hour(s)). Microbiology:    All Micro Results     Procedure Component Value Units Date/Time    CULTURE, BLOOD [[id number]] Collected:  05/12/20 1119    Order Status:  Completed Specimen:  Blood Updated:  05/12/20 1146    CULTURE, BLOOD [[id number]] Collected:  05/12/20 1121    Order Status:  Completed Specimen:  Blood Updated:  05/12/20 1146    MENINGITIS PATHOGENS PANEL, CSF (BY PCR) [[id number]] Collected:  05/11/20 0908    Order Status:  Completed Specimen:  Cerebrospinal Fluid Updated:  05/12/20 0829     Meningitis panel       RESULTS SCANNED IN [facility]          EMERGENT DISEASE PANEL [[id number]] Collected:  05/11/20 0908    Order Status:  Canceled           Studies:    5/11 MRI brain  IMPRESSION: A 5 mm and a 2 mm nonspecific, nonenhancing FLAIR white matter  hyperintensity both in the right frontal lobe. These can be seen with migraine  headaches, remote inflammation and demyelination. No acute abnormality. 5/11 CTH  IMPRESSION:     NO ACUTE INTRACRANIAL ABNORMALITY IDENTIFIED AT NONCONTRAST CT.     Signed By: Adam Street MD     May 14, 2020

## 2020-05-14 NOTE — PROGRESS NOTES
Problem: Risk for Spread of Infection  Goal: Prevent transmission of infectious organism to others  Description: Prevent the transmission of infectious organisms to other patients, staff members, and visitors. Outcome: Progressing Towards Goal     Problem: Patient Education:  Go to Education Activity  Goal: Patient/Family Education  Outcome: Progressing Towards Goal     Problem: Falls - Risk of  Goal: *Absence of Falls  Description: Document Ayaz Sol Fall Risk and appropriate interventions in the flowsheet.   Outcome: Progressing Towards Goal  Note: Fall Risk Interventions:  Mobility Interventions: Patient to call before getting OOB, Communicate number of staff needed for ambulation/transfer         Medication Interventions: Teach patient to arise slowly    Elimination Interventions: Call light in reach              Problem: Patient Education: Go to Patient Education Activity  Goal: Patient/Family Education  Outcome: Progressing Towards Goal

## 2020-05-14 NOTE — PROGRESS NOTES
HOB elevated, no acute distress, no dizziness, complains of numbness LE, encouraged to ambulate in the hallway, holding side rails for support, gait steady, no pain level, no complaints of calf pain.

## 2020-05-15 ENCOUNTER — PATIENT OUTREACH (OUTPATIENT)
Dept: CASE MANAGEMENT | Age: 20
End: 2020-05-15

## 2020-05-15 NOTE — PROGRESS NOTES
Patient contacted regarding recent discharge and COVID-19 risk   Ambulatory Care Manager contacted the patient by telephone to perform post discharge assessment. Verified name and  with patient as identifiers. Patient has following risk factors of: no known risk factors. ACM reviewed discharge instructions, medical action plan and red flags related to discharge diagnosis. Reviewed and educated them on any new and changed medications related to discharge diagnosis. Advised obtaining a 90-day supply of all daily and as-needed medications. Education provided regarding infection prevention, and signs and symptoms of COVID-19 and when to seek medical attention with patient who verbalized understanding. Discussed exposure protocols and quarantine from [address] you at higher risk for severe illness  and given an opportunity for questions and concerns. The patient agrees to contact the COVID-19 hotline [phone] or PCP office for questions related to their healthcare. CTN/ACM provided contact information for future reference. From CDC: Are you at higher risk for severe illness?  Wash your hands often.  Avoid close contact (6 feet, which is about two arm lengths) with people who are sick.  Put distance between yourself and other people if COVID-19 is spreading in your community.  Clean and disinfect frequently touched surfaces.  Avoid all cruise travel and non-essential air travel.  Call your healthcare professional if you have concerns about COVID-19 and your underlying condition or if you are sick. For more information on steps you can take to protect yourself, see CDC's How to Protect Yourself      Patient/family/caregiver given information for [facility] and agrees to enroll no    Plan for follow-up call in 7-14 days based on severity of symptoms and risk factors.

## 2020-05-16 LAB
BACTERIA SPEC CULT: NORMAL
GRAM STN SPEC: NORMAL
GRAM STN SPEC: NORMAL
HSV 1/2 AB, IGG, HSGCT: <0.34 IV
HSV 1/2 AB, IGM, HSMCT: 1.21 IV
SERVICE CMNT-IMP: NORMAL

## 2020-05-17 LAB
BACTERIA SPEC CULT: NORMAL
BACTERIA SPEC CULT: NORMAL
SERVICE CMNT-IMP: NORMAL
SERVICE CMNT-IMP: NORMAL

## 2020-05-28 ENCOUNTER — PATIENT OUTREACH (OUTPATIENT)
Dept: CASE MANAGEMENT | Age: 20
End: 2020-05-28

## 2022-03-07 NOTE — PROGRESS NOTES
PRE-SEDATION ASSESSMENT    CONSENT  Risks, benefits, and alternatives have been discussed with the patient/patient representative, and patient/patient representative agrees to proceed: Yes    MEDICAL HISTORY  Significant medical/surgical history: No  Past Complications with Sedation/Anesthesia: No  Significant Family History: No  Smoking History: No  Alcohol/Drug abuse: No  Possible Pregnancy (LMP): No  Cardiac History: No  Respiratory History: No    PHYSICAL EXAM  History and Physical Reviewed: H&P completed today  Airway Risk History: No previous complications  Airway Anatomy : Class II  Heart : Normal  Lungs : Normal  LOC/Mental Status : Normal    OTHER FINDINGS  Reviewed current medications and allergies: Yes  Pertinent lab/diagnostic test reviewed: Yes    SEDATION RISK ASSESSMENT  Risk Status ASA: Class II - Normal patient with mild systemic disease  Plan for Sedation: Moderate Sedation  Indications for Procedure/Pre-Procedure Diagnosis and Planned Procedure: Colonoscopy-screening  EKG Monitoring: Yes    NARRATIVE FINDINGS      Pt medically ready for DC home, no dc needs noted at this time. Care Management Interventions  PCP Verified by CM:  Yes  Mode of Transport at Discharge: [address] (CM Consult): Discharge Planning  Discharge Durable Medical Equipment: No  Physical Therapy Consult: No  Occupational Therapy Consult: No  Speech Therapy Consult: No  Current Support Network: Relative's Home(mother [address] [place] [address] [phone])  Confirm Follow Up Transport: Family  The Plan for Transition of Care is Related to the Following Treatment Goals : no needs  The Patient and/or Patient Representative was Provided with a Choice of Provider and Agrees with the Discharge Plan?: Yes  Freedom of Choice List was Provided with Basic Dialogue that Supports the Patient's Individualized Plan of Care/Goals, Treatment Preferences and Shares the Quality Data Associated with the Providers?: Yes   Resource Information Provided?: No  Discharge Location  Discharge Placement: Home

## 2022-03-19 PROBLEM — L30.9 ECZEMA: Status: ACTIVE | Noted: 2017-06-10

## 2022-03-20 PROBLEM — B00.3 MENINGITIS DUE TO HERPES SIMPLEX VIRUS: Status: ACTIVE | Noted: 2020-05-14

## 2023-11-28 ENCOUNTER — APPOINTMENT (OUTPATIENT)
Dept: GENERAL RADIOLOGY | Age: 23
End: 2023-11-28
Payer: COMMERCIAL

## 2023-11-28 ENCOUNTER — HOSPITAL ENCOUNTER (EMERGENCY)
Age: 23
Discharge: HOME | End: 2023-11-28
Attending: EMERGENCY MEDICINE
Payer: COMMERCIAL

## 2023-11-28 VITALS
DIASTOLIC BLOOD PRESSURE: 75 MMHG | SYSTOLIC BLOOD PRESSURE: 125 MMHG | RESPIRATION RATE: 16 BRPM | TEMPERATURE: 97.4 F | OXYGEN SATURATION: 99 % | HEART RATE: 67 BPM

## 2023-11-28 DIAGNOSIS — L03.115 CELLULITIS OF RIGHT FOOT: Primary | ICD-10-CM

## 2023-11-28 PROCEDURE — 73630 X-RAY EXAM OF FOOT: CPT

## 2023-11-28 PROCEDURE — 6360000002 HC RX W HCPCS: Performed by: EMERGENCY MEDICINE

## 2023-11-28 PROCEDURE — 2500000003 HC RX 250 WO HCPCS: Performed by: EMERGENCY MEDICINE

## 2023-11-28 PROCEDURE — 99284 EMERGENCY DEPT VISIT MOD MDM: CPT

## 2023-11-28 PROCEDURE — 96372 THER/PROPH/DIAG INJ SC/IM: CPT

## 2023-11-28 RX ORDER — SULFAMETHOXAZOLE AND TRIMETHOPRIM 800; 160 MG/1; MG/1
1 TABLET ORAL 2 TIMES DAILY
Qty: 20 TABLET | Refills: 0 | Status: SHIPPED | OUTPATIENT
Start: 2023-11-28 | End: 2023-12-08

## 2023-11-28 RX ADMIN — LIDOCAINE HYDROCHLORIDE 1000 MG: 10 INJECTION, SOLUTION INFILTRATION; PERINEURAL at 18:06

## 2023-11-28 ASSESSMENT — LIFESTYLE VARIABLES
HOW OFTEN DO YOU HAVE A DRINK CONTAINING ALCOHOL: NEVER
HOW MANY STANDARD DRINKS CONTAINING ALCOHOL DO YOU HAVE ON A TYPICAL DAY: PATIENT DOES NOT DRINK

## 2023-11-28 NOTE — ED PROVIDER NOTES
Emergency Department Provider Note       PCP: BRANNON Guerrero CNP   Age: 20 y.o. Sex: male     DISPOSITION Decision To Discharge 11/28/2023 07:12:02 PM       ICD-10-CM    1. Cellulitis of right foot  L03.115           Medical Decision Making     Complexity of Problems Addressed:  1 or more acute illnesses that pose a threat to life or bodily function. Data Reviewed and Analyzed:  I independently ordered and reviewed each unique test.  I reviewed external records: provider visit note from PCP. I interpreted the X-rays right foot NO FX. Discussion of management or test interpretation. Patient is a 40-year-old male who states he dropped an object on his right fifth toe a few weeks ago and now the last 2 days she has had redness and swelling and warmth involving the top of his right foot. Patient states he has had no fevers or chills and denies any history of diabetes. Patient states he had a small cut and now entire area is red and warm. Patient has not had an x-ray of his foot. Differential diagnosis includes but is not limited to cellulitis, right fifth toe fracture. Patient's physical exam is remarkable for    Comments: Right dorsal foot redness swelling warmth consistent with cellulitis. Patient has no tenderness along the plantar surface and no evidence of abscess. Patient has a healing wound between his 4th webspace     Patient's x-rays negative for acute fracture per my independent interpretation. We will DC home with prescription for Bactrim and patient has received Rocephin IM here. Patient has been instructed return in 3 days for recheck and return immediately for any fevers. Patient has no appreciable abscess on exam and no evidence of deep tissue infection. No crepitus and patient afebrile. Risk of Complications and/or Morbidity of Patient Management:  Prescription drug management performed.   Shared medical decision making was utilized in creating the